# Patient Record
Sex: FEMALE | Race: WHITE | NOT HISPANIC OR LATINO | Employment: OTHER | URBAN - METROPOLITAN AREA
[De-identification: names, ages, dates, MRNs, and addresses within clinical notes are randomized per-mention and may not be internally consistent; named-entity substitution may affect disease eponyms.]

---

## 2018-02-13 ENCOUNTER — CONVERSION ENCOUNTER (OUTPATIENT)
Dept: GENERAL RADIOLOGY | Facility: HOSPITAL | Age: 83
End: 2018-02-13

## 2019-02-06 ENCOUNTER — OFFICE VISIT CONVERTED (OUTPATIENT)
Dept: NEUROLOGY | Facility: CLINIC | Age: 84
End: 2019-02-06
Attending: PSYCHIATRY & NEUROLOGY

## 2019-03-01 ENCOUNTER — HOSPITAL ENCOUNTER (OUTPATIENT)
Dept: GENERAL RADIOLOGY | Facility: HOSPITAL | Age: 84
Discharge: HOME | End: 2019-03-01
Attending: NURSE PRACTITIONER

## 2019-08-07 ENCOUNTER — OFFICE VISIT CONVERTED (OUTPATIENT)
Dept: NEUROLOGY | Facility: CLINIC | Age: 84
End: 2019-08-07
Attending: PSYCHIATRY & NEUROLOGY

## 2019-10-23 ENCOUNTER — OFFICE VISIT CONVERTED (OUTPATIENT)
Dept: FAMILY MEDICINE CLINIC | Facility: CLINIC | Age: 84
End: 2019-10-23
Attending: NURSE PRACTITIONER

## 2019-10-23 ENCOUNTER — CONVERSION ENCOUNTER (OUTPATIENT)
Dept: OTHER | Facility: HOSPITAL | Age: 84
End: 2019-10-23

## 2019-10-28 ENCOUNTER — HOSPITAL ENCOUNTER (OUTPATIENT)
Dept: GENERAL RADIOLOGY | Facility: HOSPITAL | Age: 84
Discharge: HOME | End: 2019-10-28
Attending: NURSE PRACTITIONER

## 2019-10-29 ENCOUNTER — HOSPITAL ENCOUNTER (OUTPATIENT)
Dept: OTHER | Facility: HOSPITAL | Age: 84
Discharge: HOME | End: 2019-10-29
Attending: OBSTETRICS & GYNECOLOGY

## 2019-11-01 LAB — BACTERIA UR CULT: NORMAL

## 2019-11-04 ENCOUNTER — CONVERSION ENCOUNTER (OUTPATIENT)
Dept: FAMILY MEDICINE CLINIC | Facility: CLINIC | Age: 84
End: 2019-11-04

## 2019-11-04 ENCOUNTER — OFFICE VISIT CONVERTED (OUTPATIENT)
Dept: FAMILY MEDICINE CLINIC | Facility: CLINIC | Age: 84
End: 2019-11-04
Attending: NURSE PRACTITIONER

## 2019-11-21 ENCOUNTER — HOSPITAL ENCOUNTER (OUTPATIENT)
Dept: GENERAL RADIOLOGY | Facility: HOSPITAL | Age: 84
Discharge: HOME | End: 2019-11-21
Attending: NURSE PRACTITIONER

## 2019-11-21 ENCOUNTER — OFFICE VISIT CONVERTED (OUTPATIENT)
Dept: FAMILY MEDICINE CLINIC | Facility: CLINIC | Age: 84
End: 2019-11-21
Attending: NURSE PRACTITIONER

## 2019-12-18 ENCOUNTER — OFFICE VISIT CONVERTED (OUTPATIENT)
Dept: NEUROLOGY | Facility: CLINIC | Age: 84
End: 2019-12-18
Attending: PSYCHIATRY & NEUROLOGY

## 2019-12-18 ENCOUNTER — CONVERSION ENCOUNTER (OUTPATIENT)
Dept: NEUROLOGY | Facility: CLINIC | Age: 84
End: 2019-12-18

## 2019-12-19 ENCOUNTER — OFFICE VISIT CONVERTED (OUTPATIENT)
Dept: NEUROSURGERY | Facility: CLINIC | Age: 84
End: 2019-12-19
Attending: PHYSICIAN ASSISTANT

## 2019-12-30 ENCOUNTER — HOSPITAL ENCOUNTER (OUTPATIENT)
Dept: GENERAL RADIOLOGY | Facility: HOSPITAL | Age: 84
Discharge: HOME | End: 2019-12-30
Attending: PHYSICIAN ASSISTANT

## 2020-01-06 ENCOUNTER — HOSPITAL ENCOUNTER (OUTPATIENT)
Dept: GENERAL RADIOLOGY | Facility: HOSPITAL | Age: 85
Discharge: HOME | End: 2020-01-06
Attending: NURSE PRACTITIONER

## 2020-01-07 ENCOUNTER — OFFICE VISIT CONVERTED (OUTPATIENT)
Dept: FAMILY MEDICINE CLINIC | Facility: CLINIC | Age: 85
End: 2020-01-07
Attending: NURSE PRACTITIONER

## 2020-01-08 ENCOUNTER — HOSPITAL ENCOUNTER (OUTPATIENT)
Dept: LAB | Facility: HOSPITAL | Age: 85
Discharge: HOME | End: 2020-01-08
Attending: NURSE PRACTITIONER

## 2020-01-08 LAB
25(OH)D3 SERPL-MCNC: 51 NG/ML (ref 30–100)
ALBUMIN SERPL-MCNC: 4.4 G/DL (ref 3.5–5)
ALBUMIN/GLOB SERPL: 1.3 {RATIO} (ref 1.4–2.6)
ALP SERPL-CCNC: 32 U/L (ref 43–160)
ALT SERPL-CCNC: 6 U/L (ref 10–40)
ANION GAP SERPL CALC-SCNC: 16 MMOL/L (ref 8–19)
AST SERPL-CCNC: 18 U/L (ref 15–50)
BASOPHILS # BLD AUTO: 0.07 10*3/UL (ref 0–0.2)
BASOPHILS NFR BLD AUTO: 1.2 % (ref 0–3)
BILIRUB SERPL-MCNC: 0.39 MG/DL (ref 0.2–1.3)
BUN SERPL-MCNC: 12 MG/DL (ref 5–25)
BUN/CREAT SERPL: 17 {RATIO} (ref 6–20)
CALCIUM SERPL-MCNC: 9.6 MG/DL (ref 8.7–10.4)
CHLORIDE SERPL-SCNC: 98 MMOL/L (ref 99–111)
CHOLEST SERPL-MCNC: 232 MG/DL (ref 107–200)
CHOLEST/HDLC SERPL: 4.1 {RATIO} (ref 3–6)
CONV ABS IMM GRAN: 0.01 10*3/UL (ref 0–0.2)
CONV CO2: 25 MMOL/L (ref 22–32)
CONV IMMATURE GRAN: 0.2 % (ref 0–1.8)
CONV TOTAL PROTEIN: 7.8 G/DL (ref 6.3–8.2)
CREAT UR-MCNC: 0.69 MG/DL (ref 0.5–0.9)
DEPRECATED RDW RBC AUTO: 46.2 FL (ref 36.4–46.3)
EOSINOPHIL # BLD AUTO: 0.1 10*3/UL (ref 0–0.7)
EOSINOPHIL # BLD AUTO: 1.7 % (ref 0–7)
ERYTHROCYTE [DISTWIDTH] IN BLOOD BY AUTOMATED COUNT: 13.4 % (ref 11.7–14.4)
GFR SERPLBLD BASED ON 1.73 SQ M-ARVRAT: >60 ML/MIN/{1.73_M2}
GLOBULIN UR ELPH-MCNC: 3.4 G/DL (ref 2–3.5)
GLUCOSE SERPL-MCNC: 99 MG/DL (ref 65–99)
HCT VFR BLD AUTO: 39 % (ref 37–47)
HDLC SERPL-MCNC: 56 MG/DL (ref 40–60)
HGB BLD-MCNC: 12.5 G/DL (ref 12–16)
LDLC SERPL CALC-MCNC: 148 MG/DL (ref 70–100)
LYMPHOCYTES # BLD AUTO: 2.28 10*3/UL (ref 1–5)
LYMPHOCYTES NFR BLD AUTO: 39.4 % (ref 20–45)
MCH RBC QN AUTO: 30 PG (ref 27–31)
MCHC RBC AUTO-ENTMCNC: 32.1 G/DL (ref 33–37)
MCV RBC AUTO: 93.5 FL (ref 81–99)
MONOCYTES # BLD AUTO: 0.58 10*3/UL (ref 0.2–1.2)
MONOCYTES NFR BLD AUTO: 10 % (ref 3–10)
NEUTROPHILS # BLD AUTO: 2.75 10*3/UL (ref 2–8)
NEUTROPHILS NFR BLD AUTO: 47.5 % (ref 30–85)
NRBC CBCN: 0 % (ref 0–0.7)
OSMOLALITY SERPL CALC.SUM OF ELEC: 280 MOSM/KG (ref 273–304)
PLATELET # BLD AUTO: 398 10*3/UL (ref 130–400)
PMV BLD AUTO: 9.4 FL (ref 9.4–12.3)
POTASSIUM SERPL-SCNC: 4.3 MMOL/L (ref 3.5–5.3)
RBC # BLD AUTO: 4.17 10*6/UL (ref 4.2–5.4)
SODIUM SERPL-SCNC: 135 MMOL/L (ref 135–147)
T4 FREE SERPL-MCNC: 1.5 NG/DL (ref 0.9–1.8)
TRIGL SERPL-MCNC: 140 MG/DL (ref 40–150)
TSH SERPL-ACNC: 2.14 M[IU]/L (ref 0.27–4.2)
VLDLC SERPL-MCNC: 28 MG/DL (ref 5–37)
WBC # BLD AUTO: 5.79 10*3/UL (ref 4.8–10.8)

## 2020-01-14 ENCOUNTER — OFFICE VISIT CONVERTED (OUTPATIENT)
Dept: FAMILY MEDICINE CLINIC | Facility: CLINIC | Age: 85
End: 2020-01-14
Attending: NURSE PRACTITIONER

## 2020-01-23 ENCOUNTER — OFFICE VISIT CONVERTED (OUTPATIENT)
Dept: NEUROSURGERY | Facility: CLINIC | Age: 85
End: 2020-01-23
Attending: NEUROLOGICAL SURGERY

## 2020-02-25 ENCOUNTER — OFFICE VISIT CONVERTED (OUTPATIENT)
Dept: FAMILY MEDICINE CLINIC | Facility: CLINIC | Age: 85
End: 2020-02-25
Attending: NURSE PRACTITIONER

## 2020-02-25 ENCOUNTER — HOSPITAL ENCOUNTER (OUTPATIENT)
Dept: GENERAL RADIOLOGY | Facility: HOSPITAL | Age: 85
Discharge: HOME | End: 2020-02-25
Attending: NURSE PRACTITIONER

## 2020-04-23 ENCOUNTER — TELEPHONE CONVERTED (OUTPATIENT)
Dept: FAMILY MEDICINE CLINIC | Facility: CLINIC | Age: 85
End: 2020-04-23
Attending: NURSE PRACTITIONER

## 2020-06-18 ENCOUNTER — CONVERSION ENCOUNTER (OUTPATIENT)
Dept: NEUROLOGY | Facility: CLINIC | Age: 85
End: 2020-06-18

## 2020-06-18 ENCOUNTER — OFFICE VISIT CONVERTED (OUTPATIENT)
Dept: NEUROLOGY | Facility: CLINIC | Age: 85
End: 2020-06-18
Attending: PSYCHIATRY & NEUROLOGY

## 2020-07-02 ENCOUNTER — HOSPITAL ENCOUNTER (OUTPATIENT)
Dept: GENERAL RADIOLOGY | Facility: HOSPITAL | Age: 85
Discharge: HOME | End: 2020-07-02
Attending: NURSE PRACTITIONER

## 2020-07-08 ENCOUNTER — HOSPITAL ENCOUNTER (OUTPATIENT)
Dept: LAB | Facility: HOSPITAL | Age: 85
Discharge: HOME | End: 2020-07-08
Attending: NURSE PRACTITIONER

## 2020-07-08 LAB
CHOLEST SERPL-MCNC: 235 MG/DL (ref 107–200)
CHOLEST/HDLC SERPL: 3.4 {RATIO} (ref 3–6)
HDLC SERPL-MCNC: 69 MG/DL (ref 40–60)
LDLC SERPL CALC-MCNC: 148 MG/DL (ref 70–100)
TRIGL SERPL-MCNC: 88 MG/DL (ref 40–150)
VLDLC SERPL-MCNC: 18 MG/DL (ref 5–37)

## 2020-07-14 ENCOUNTER — HOSPITAL ENCOUNTER (OUTPATIENT)
Dept: URGENT CARE | Facility: CLINIC | Age: 85
Discharge: HOME | End: 2020-07-14
Attending: INTERNAL MEDICINE

## 2020-07-17 LAB — SARS-COV-2 RNA SPEC QL NAA+PROBE: NOT DETECTED

## 2020-07-23 ENCOUNTER — HOSPITAL ENCOUNTER (OUTPATIENT)
Dept: LAB | Facility: HOSPITAL | Age: 85
Discharge: HOME | End: 2020-07-23
Attending: INTERNAL MEDICINE

## 2020-07-23 LAB
ALBUMIN SERPL-MCNC: 4.5 G/DL (ref 3.5–5)
ALBUMIN/GLOB SERPL: 1.4 {RATIO} (ref 1.4–2.6)
ALP SERPL-CCNC: 29 U/L (ref 43–160)
ALT SERPL-CCNC: <5 U/L (ref 10–40)
ANION GAP SERPL CALC-SCNC: 19 MMOL/L (ref 8–19)
AST SERPL-CCNC: 20 U/L (ref 15–50)
BILIRUB SERPL-MCNC: 0.35 MG/DL (ref 0.2–1.3)
BUN SERPL-MCNC: 14 MG/DL (ref 5–25)
BUN/CREAT SERPL: 19 {RATIO} (ref 6–20)
CALCIUM SERPL-MCNC: 9.7 MG/DL (ref 8.7–10.4)
CHLORIDE SERPL-SCNC: 100 MMOL/L (ref 99–111)
CHOLEST SERPL-MCNC: 226 MG/DL (ref 107–200)
CHOLEST/HDLC SERPL: 3.4 {RATIO} (ref 3–6)
CONV CO2: 26 MMOL/L (ref 22–32)
CONV TOTAL PROTEIN: 7.8 G/DL (ref 6.3–8.2)
CREAT UR-MCNC: 0.75 MG/DL (ref 0.5–0.9)
GFR SERPLBLD BASED ON 1.73 SQ M-ARVRAT: >60 ML/MIN/{1.73_M2}
GLOBULIN UR ELPH-MCNC: 3.3 G/DL (ref 2–3.5)
GLUCOSE SERPL-MCNC: 103 MG/DL (ref 65–99)
HDLC SERPL-MCNC: 67 MG/DL (ref 40–60)
LDLC SERPL CALC-MCNC: 138 MG/DL (ref 70–100)
OSMOLALITY SERPL CALC.SUM OF ELEC: 291 MOSM/KG (ref 273–304)
POTASSIUM SERPL-SCNC: 4.5 MMOL/L (ref 3.5–5.3)
SODIUM SERPL-SCNC: 140 MMOL/L (ref 135–147)
T4 FREE SERPL-MCNC: 1.5 NG/DL (ref 0.9–1.8)
TRIGL SERPL-MCNC: 103 MG/DL (ref 40–150)
TSH SERPL-ACNC: 2.16 M[IU]/L (ref 0.27–4.2)
VLDLC SERPL-MCNC: 21 MG/DL (ref 5–37)

## 2020-09-08 ENCOUNTER — TELEMEDICINE CONVERTED (OUTPATIENT)
Dept: FAMILY MEDICINE CLINIC | Facility: CLINIC | Age: 85
End: 2020-09-08
Attending: NURSE PRACTITIONER

## 2020-09-08 ENCOUNTER — HOSPITAL ENCOUNTER (OUTPATIENT)
Dept: GENERAL RADIOLOGY | Facility: HOSPITAL | Age: 85
Discharge: HOME | End: 2020-09-08
Attending: NURSE PRACTITIONER

## 2020-10-26 ENCOUNTER — OFFICE VISIT CONVERTED (OUTPATIENT)
Dept: FAMILY MEDICINE CLINIC | Facility: CLINIC | Age: 85
End: 2020-10-26
Attending: NURSE PRACTITIONER

## 2020-10-30 ENCOUNTER — HOSPITAL ENCOUNTER (OUTPATIENT)
Dept: LAB | Facility: HOSPITAL | Age: 85
Discharge: HOME | End: 2020-10-30
Attending: NURSE PRACTITIONER

## 2020-10-30 LAB
25(OH)D3 SERPL-MCNC: 53.6 NG/ML (ref 30–100)
ALBUMIN SERPL-MCNC: 4.3 G/DL (ref 3.5–5)
ALBUMIN/GLOB SERPL: 1.4 {RATIO} (ref 1.4–2.6)
ALP SERPL-CCNC: 33 U/L (ref 43–160)
ALT SERPL-CCNC: 14 U/L (ref 10–40)
ANION GAP SERPL CALC-SCNC: 16 MMOL/L (ref 8–19)
AST SERPL-CCNC: 19 U/L (ref 15–50)
BASOPHILS # BLD AUTO: 0.06 10*3/UL (ref 0–0.2)
BASOPHILS NFR BLD AUTO: 0.9 % (ref 0–3)
BILIRUB SERPL-MCNC: 0.3 MG/DL (ref 0.2–1.3)
BUN SERPL-MCNC: 17 MG/DL (ref 5–25)
BUN/CREAT SERPL: 23 {RATIO} (ref 6–20)
CALCIUM SERPL-MCNC: 9.2 MG/DL (ref 8.7–10.4)
CHLORIDE SERPL-SCNC: 102 MMOL/L (ref 99–111)
CHOLEST SERPL-MCNC: 228 MG/DL (ref 107–200)
CHOLEST/HDLC SERPL: 3.5 {RATIO} (ref 3–6)
CONV ABS IMM GRAN: 0.02 10*3/UL (ref 0–0.2)
CONV CO2: 25 MMOL/L (ref 22–32)
CONV IMMATURE GRAN: 0.3 % (ref 0–1.8)
CONV TOTAL PROTEIN: 7.4 G/DL (ref 6.3–8.2)
CREAT UR-MCNC: 0.74 MG/DL (ref 0.5–0.9)
DEPRECATED RDW RBC AUTO: 44.1 FL (ref 36.4–46.3)
EOSINOPHIL # BLD AUTO: 0.13 10*3/UL (ref 0–0.7)
EOSINOPHIL # BLD AUTO: 2 % (ref 0–7)
ERYTHROCYTE [DISTWIDTH] IN BLOOD BY AUTOMATED COUNT: 13.1 % (ref 11.7–14.4)
GFR SERPLBLD BASED ON 1.73 SQ M-ARVRAT: >60 ML/MIN/{1.73_M2}
GLOBULIN UR ELPH-MCNC: 3.1 G/DL (ref 2–3.5)
GLUCOSE SERPL-MCNC: 94 MG/DL (ref 65–99)
HCT VFR BLD AUTO: 37.7 % (ref 37–47)
HDLC SERPL-MCNC: 65 MG/DL (ref 40–60)
HGB BLD-MCNC: 12 G/DL (ref 12–16)
LDLC SERPL CALC-MCNC: 138 MG/DL (ref 70–100)
LYMPHOCYTES # BLD AUTO: 2.52 10*3/UL (ref 1–5)
LYMPHOCYTES NFR BLD AUTO: 38.3 % (ref 20–45)
MAGNESIUM SERPL-MCNC: 2.08 MG/DL (ref 1.6–2.3)
MCH RBC QN AUTO: 29.2 PG (ref 27–31)
MCHC RBC AUTO-ENTMCNC: 31.8 G/DL (ref 33–37)
MCV RBC AUTO: 91.7 FL (ref 81–99)
MONOCYTES # BLD AUTO: 0.58 10*3/UL (ref 0.2–1.2)
MONOCYTES NFR BLD AUTO: 8.8 % (ref 3–10)
NEUTROPHILS # BLD AUTO: 3.27 10*3/UL (ref 2–8)
NEUTROPHILS NFR BLD AUTO: 49.7 % (ref 30–85)
NRBC CBCN: 0 % (ref 0–0.7)
OSMOLALITY SERPL CALC.SUM OF ELEC: 289 MOSM/KG (ref 273–304)
PLATELET # BLD AUTO: 351 10*3/UL (ref 130–400)
PMV BLD AUTO: 10.1 FL (ref 9.4–12.3)
POTASSIUM SERPL-SCNC: 4.3 MMOL/L (ref 3.5–5.3)
RBC # BLD AUTO: 4.11 10*6/UL (ref 4.2–5.4)
SODIUM SERPL-SCNC: 139 MMOL/L (ref 135–147)
T4 FREE SERPL-MCNC: 1.4 NG/DL (ref 0.9–1.8)
TRIGL SERPL-MCNC: 123 MG/DL (ref 40–150)
TSH SERPL-ACNC: 2.77 M[IU]/L (ref 0.27–4.2)
VLDLC SERPL-MCNC: 25 MG/DL (ref 5–37)
WBC # BLD AUTO: 6.58 10*3/UL (ref 4.8–10.8)

## 2020-11-03 ENCOUNTER — HOSPITAL ENCOUNTER (OUTPATIENT)
Dept: GENERAL RADIOLOGY | Facility: HOSPITAL | Age: 85
Discharge: HOME | End: 2020-11-03
Attending: NURSE PRACTITIONER

## 2020-11-23 ENCOUNTER — OFFICE VISIT CONVERTED (OUTPATIENT)
Dept: PODIATRY | Facility: CLINIC | Age: 85
End: 2020-11-23
Attending: PODIATRIST

## 2020-11-24 ENCOUNTER — OFFICE VISIT CONVERTED (OUTPATIENT)
Dept: CARDIOLOGY | Facility: CLINIC | Age: 85
End: 2020-11-24
Attending: INTERNAL MEDICINE

## 2020-11-24 ENCOUNTER — TELEMEDICINE CONVERTED (OUTPATIENT)
Dept: FAMILY MEDICINE CLINIC | Facility: CLINIC | Age: 85
End: 2020-11-24
Attending: NURSE PRACTITIONER

## 2020-11-25 ENCOUNTER — HOSPITAL ENCOUNTER (OUTPATIENT)
Dept: LAB | Facility: HOSPITAL | Age: 85
Discharge: HOME | End: 2020-11-25
Attending: NURSE PRACTITIONER

## 2020-11-25 LAB
APPEARANCE UR: ABNORMAL
BILIRUB UR QL: NEGATIVE
COLOR UR: YELLOW
CONV BACTERIA: NEGATIVE
CONV COLLECTION SOURCE (UA): ABNORMAL
CONV UROBILINOGEN IN URINE BY AUTOMATED TEST STRIP: 0.2 {EHRLICHU}/DL (ref 0.1–1)
GLUCOSE UR QL: NEGATIVE MG/DL
HGB UR QL STRIP: NEGATIVE
KETONES UR QL STRIP: NEGATIVE MG/DL
LEUKOCYTE ESTERASE UR QL STRIP: ABNORMAL
NITRITE UR QL STRIP: NEGATIVE
PH UR STRIP.AUTO: 6.5 [PH] (ref 5–8)
PROT UR QL: NEGATIVE MG/DL
RBC #/AREA URNS HPF: ABNORMAL /[HPF]
SP GR UR: 1.01 (ref 1–1.03)
WBC #/AREA URNS HPF: ABNORMAL /[HPF]

## 2020-11-27 LAB — BACTERIA UR CULT: NORMAL

## 2020-12-14 ENCOUNTER — OFFICE VISIT CONVERTED (OUTPATIENT)
Dept: OTOLARYNGOLOGY | Facility: CLINIC | Age: 85
End: 2020-12-14
Attending: NURSE PRACTITIONER

## 2021-01-14 ENCOUNTER — HOSPITAL ENCOUNTER (OUTPATIENT)
Dept: GENERAL RADIOLOGY | Facility: HOSPITAL | Age: 86
Discharge: HOME | End: 2021-01-14
Attending: NURSE PRACTITIONER

## 2021-02-22 ENCOUNTER — HOSPITAL ENCOUNTER (OUTPATIENT)
Dept: LAB | Facility: HOSPITAL | Age: 86
Discharge: HOME | End: 2021-02-22
Attending: INTERNAL MEDICINE

## 2021-02-22 LAB
ALBUMIN SERPL-MCNC: 4.3 G/DL (ref 3.5–5)
ALBUMIN/GLOB SERPL: 1.4 {RATIO} (ref 1.4–2.6)
ALP SERPL-CCNC: 27 U/L (ref 43–160)
ALT SERPL-CCNC: 14 U/L (ref 10–40)
ANION GAP SERPL CALC-SCNC: 14 MMOL/L (ref 8–19)
AST SERPL-CCNC: 18 U/L (ref 15–50)
BILIRUB SERPL-MCNC: 0.25 MG/DL (ref 0.2–1.3)
BUN SERPL-MCNC: 16 MG/DL (ref 5–25)
BUN/CREAT SERPL: 22 {RATIO} (ref 6–20)
CALCIUM SERPL-MCNC: 9.3 MG/DL (ref 8.7–10.4)
CHLORIDE SERPL-SCNC: 102 MMOL/L (ref 99–111)
CHOLEST SERPL-MCNC: 175 MG/DL (ref 107–200)
CHOLEST/HDLC SERPL: 2.7 {RATIO} (ref 3–6)
CONV CO2: 27 MMOL/L (ref 22–32)
CONV TOTAL PROTEIN: 7.3 G/DL (ref 6.3–8.2)
CREAT UR-MCNC: 0.73 MG/DL (ref 0.5–0.9)
GFR SERPLBLD BASED ON 1.73 SQ M-ARVRAT: >60 ML/MIN/{1.73_M2}
GLOBULIN UR ELPH-MCNC: 3 G/DL (ref 2–3.5)
GLUCOSE SERPL-MCNC: 97 MG/DL (ref 65–99)
HDLC SERPL-MCNC: 65 MG/DL (ref 40–60)
LDLC SERPL CALC-MCNC: 88 MG/DL (ref 70–100)
OSMOLALITY SERPL CALC.SUM OF ELEC: 287 MOSM/KG (ref 273–304)
POTASSIUM SERPL-SCNC: 4.7 MMOL/L (ref 3.5–5.3)
SODIUM SERPL-SCNC: 138 MMOL/L (ref 135–147)
TRIGL SERPL-MCNC: 109 MG/DL (ref 40–150)
VLDLC SERPL-MCNC: 22 MG/DL (ref 5–37)

## 2021-03-03 ENCOUNTER — CONVERSION ENCOUNTER (OUTPATIENT)
Dept: ORTHOPEDIC SURGERY | Facility: CLINIC | Age: 86
End: 2021-03-03

## 2021-03-03 ENCOUNTER — OFFICE VISIT CONVERTED (OUTPATIENT)
Dept: ORTHOPEDIC SURGERY | Facility: CLINIC | Age: 86
End: 2021-03-03
Attending: ORTHOPAEDIC SURGERY

## 2021-03-16 ENCOUNTER — HOSPITAL ENCOUNTER (OUTPATIENT)
Dept: VACCINE CLINIC | Facility: HOSPITAL | Age: 86
Discharge: HOME | End: 2021-03-16
Attending: INTERNAL MEDICINE

## 2021-03-18 ENCOUNTER — PROCEDURE VISIT CONVERTED (OUTPATIENT)
Dept: PODIATRY | Facility: CLINIC | Age: 86
End: 2021-03-18
Attending: PODIATRIST

## 2021-03-18 ENCOUNTER — CONVERSION ENCOUNTER (OUTPATIENT)
Dept: PODIATRY | Facility: CLINIC | Age: 86
End: 2021-03-18

## 2021-04-06 ENCOUNTER — HOSPITAL ENCOUNTER (OUTPATIENT)
Dept: VACCINE CLINIC | Facility: HOSPITAL | Age: 86
Discharge: HOME | End: 2021-04-06
Attending: INTERNAL MEDICINE

## 2021-04-12 ENCOUNTER — CONVERSION ENCOUNTER (OUTPATIENT)
Dept: FAMILY MEDICINE CLINIC | Facility: CLINIC | Age: 86
End: 2021-04-12

## 2021-04-12 ENCOUNTER — OFFICE VISIT CONVERTED (OUTPATIENT)
Dept: FAMILY MEDICINE CLINIC | Facility: CLINIC | Age: 86
End: 2021-04-12
Attending: NURSE PRACTITIONER

## 2021-04-12 LAB
BILIRUB UR QL STRIP: NEGATIVE
COLOR UR: NORMAL
CONV CLARITY OF URINE: CLEAR
CONV PROTEIN IN URINE BY AUTOMATED TEST STRIP: NEGATIVE
CONV UROBILINOGEN IN URINE BY AUTOMATED TEST STRIP: NORMAL
GLUCOSE UR QL: NEGATIVE
HGB UR QL STRIP: NORMAL
KETONES UR QL STRIP: NEGATIVE
LEUKOCYTE ESTERASE UR QL STRIP: NORMAL
NITRITE UR QL STRIP: NEGATIVE
PH UR STRIP.AUTO: 7 [PH]
SP GR UR: 1.01

## 2021-04-13 ENCOUNTER — HOSPITAL ENCOUNTER (OUTPATIENT)
Dept: FAMILY MEDICINE CLINIC | Facility: CLINIC | Age: 86
Discharge: HOME | End: 2021-04-13
Attending: NURSE PRACTITIONER

## 2021-04-15 LAB
AMPICILLIN SUSC ISLT: >=32
AMPICILLIN+SULBAC SUSC ISLT: 8
BACTERIA UR CULT: ABNORMAL
CEFAZOLIN SUSC ISLT: >=64
CEFEPIME SUSC ISLT: 8
CEFTAZIDIME SUSC ISLT: 16
CEFTRIAXONE SUSC ISLT: >=64
CIPROFLOXACIN SUSC ISLT: <=0.25
ERTAPENEM SUSC ISLT: <=0.12
GENTAMICIN SUSC ISLT: <=1
LEVOFLOXACIN SUSC ISLT: <=0.12
NITROFURANTOIN SUSC ISLT: <=16
PIP+TAZO SUSC ISLT: <=4
TMP SMX SUSC ISLT: <=20
TOBRAMYCIN SUSC ISLT: <=1

## 2021-04-29 ENCOUNTER — HOSPITAL ENCOUNTER (OUTPATIENT)
Dept: FAMILY MEDICINE CLINIC | Facility: CLINIC | Age: 86
Discharge: HOME | End: 2021-04-29
Attending: NURSE PRACTITIONER

## 2021-04-29 ENCOUNTER — OFFICE VISIT CONVERTED (OUTPATIENT)
Dept: FAMILY MEDICINE CLINIC | Facility: CLINIC | Age: 86
End: 2021-04-29
Attending: NURSE PRACTITIONER

## 2021-04-29 LAB
BILIRUB UR QL STRIP: NEGATIVE
COLOR UR: NORMAL
CONV CLARITY OF URINE: CLEAR
CONV PROTEIN IN URINE BY AUTOMATED TEST STRIP: NEGATIVE
CONV UROBILINOGEN IN URINE BY AUTOMATED TEST STRIP: NORMAL
GLUCOSE UR QL: NEGATIVE
HGB UR QL STRIP: NEGATIVE
KETONES UR QL STRIP: NEGATIVE
LEUKOCYTE ESTERASE UR QL STRIP: NORMAL
NITRITE UR QL STRIP: NEGATIVE
PH UR STRIP.AUTO: 7 [PH]
SP GR UR: 1.01

## 2021-05-01 LAB — BACTERIA UR CULT: NORMAL

## 2021-05-10 NOTE — H&P
History and Physical      Patient Name: Geovanna Whitten   Patient ID: [id number]   Sex: Female   YOB: 1935    Primary Care Provider: Rivka MALHOTRA   Referring Provider: Rivka MALHOTRA    Visit Date: November 24, 2020    Provider: Laith Boyer MD   Location: [facility]   Location Address: [address] [place] [address], [address]   [place]  [id number]   Location Phone: [phone]          Chief Complaint  · She was referred here to establish care due to hypertension and hyperlipidemia       History Of Present Illness  Consult requested by: Rivka MALHOTRA   Geovanna Whitten is a pleasant, 85 year old, /White female with a history of hypertension, hyperlipidemia, non-obstructive carotid stenosis and moderate mitral regurgitation, who was referred here to establish care by her PCP, Rivka Blair. She previously saw Dr. Lacy many years ago and has most recently been following with Dr. Rasmusesn for her cardiac care. She states she is feeling well and remains at her normal physical-activity baseline. She does not report any episodes of chest pain/pressure, any palpitations, orthopnea, PND, peripheral edema, lightheadedness or syncope. She does have mild chronic exertional dyspnea but states this has been stable for several years. She is tolerating all her home medications well but states her blood pressure readings continue to be elevated. Her blood pressure was 140/48 in the office today. Her most recent LDL was elevated at 138 on lab studies checked last month. She has a history of intolerance to numerous statins (including Atorvastatin, Lovastatin and Simvastatin) due to severe myalgias and a tremor, which she is convinced was caused by previous statin use. Ms. Whitten states she is unwilling to consider any further statins at this time. Her most recent echocardiogram obtained in Dr. Rasmussen's office in July 2020 showed normal left ventricular systolic function with an  estimated ejection fraction of 60% and sclerotic mitral and aortic valves with moderate mitral regurgitation and moderate aortic regurgitation. She was also noted to have a mildly elevated estimated pulmonary arterial pressure on that study. We are still waiting to receive a copy of her most recent EKG she had at Dr. Rasmussen's office several months ago.   PAST MEDICAL HISTORY: Hypertension; hyperlipidemia; moderate mitral regurgitation; moderate aortic regurgitation; mild pulmonary hypertension; osteoarthritis; glaucoma; essential tremor; hypothyroidism. PAST SURGICAL HISTORY: Hysterectomy; colonoscopy; bilateral cataract excision.   PSYCHOSOCIAL HISTORY: She does not report any history of tobacco use, alcohol abuse or illicit drug use. She is  and lives alone. She states she walks one to two blocks per day and engages in yoga several times per week.   FAMILY HISTORY: Positive for diabetes mellitus in her mother and hypertension in multiple family members; also positive for coronary artery disease in her father.   CURRENT MEDICATIONS: include Levothyroxine 75 mcg daily; Lisinopril 10 mg daily; Amlodipine 5 mg daily; carbidopa/levodopa 25/100 mg b.i.d.; Pramipexole 0.5 mg b.i.d.; Latanoprost; Vitamin D3; multivitamin; Tylenol Arthritis; Citalopram 10 mg p.r.n. The dosage and frequency of the medications were reviewed with the patient.   ALLERGIES: Sulfa, iodine.       Review of Systems  · Constitutional  o Denies  o : fatigue, good general health lately, recent weight changes   · Eyes  o Admits  o : blurred vision  o Denies  o : double vision  · HENT  o Admits  o : hearing loss or ringing, chronic sinus problem  o Denies  o : swollen glands in neck  · Cardiovascular  o Admits  o : swelling (feet, ankles, hands)  o Denies  o : chest pain, palpitations (fast, fluttering, or skipping beats), shortness of breath while walking or lying flat  · Respiratory  o Admits  o : chronic or frequent cough  o Denies  o :  "asthma or wheezing, COPD  · Gastrointestinal  o Denies  o : ulcers, nausea or vomiting  · Neurologic  o Denies  o : lightheaded or dizzy, stroke, headaches  · Musculoskeletal  o Admits  o : joint pain, back pain  · Endocrine  o Admits  o : thyroid disease, heat or cold intolerance, excessive thirst or urination  o Denies  o : diabetes  · Heme-Lymph  o Admits  o : bleeding or bruising tendency  o Denies  o : anemia      Vitals  Date Time BP Position Site L\R Cuff Size HR RR TEMP (F) WT  HT  BMI kg/m2 BSA m2 O2 Sat FR L/min FiO2 HC       11/24/2020 11:35 /48 Sitting    70 - R   141lbs 0oz 5'  4\" 24.2 1.7             Physical Examination  · Constitutional  o Appearance  o : Awake, alert, in no acute distress.  · Head and Face  o HEENT  o : No pallor, anicteric. Eyes normal. Moist mucous membranes.  · Neck  o Inspection/Palpation  o : Supple.   o Jugular Veins  o : No JVD. No carotid bruits. No masses.  · Respiratory  o Auscultation of Lungs  o : Clear to auscultation bilaterally. No wheezing, rales or rhonchi. No tachycardia. Normal effort with no increased work of breathing. No dullness to percussion.  · Cardiovascular  o Heart  o : Regular rate and rhythm. Normal S1 and S2. There is a 2/6 systolic murmur at the apex with radiation to the axilla as well as a systolic and mild diastolic murmur at the right upper sternal border. No friction rub. No S3 or S4 gallop.  · Gastrointestinal  o Abdominal Examination  o : Soft, non-tender, non-distended. Normal bowel sounds in all quadrants. No masses. No hepatosplenomegaly.   · Lymphatic  o Axilla  o : No cervical or axilla lymphadenopathy.  · Skin and Subcutaneous Tissue  o General Inspection  o : Warm and dry. No rashes or lesions. Normal skin turgor. Normal capillary refill.   · Neurologic  o Cranial Nerves  o : Normal speech. Cranial nerves 2-12 intact. No focal motor deficits.   · Extremities  o Extremities  o : No cyanosis, clubbing or edema. 2+ radial pulses " bilaterally.     Labs from October 30, 2020 - CBC:  White blood cells 6.58, hemoglobin 12.0, hematocrit 37.7, platelets 351.  Chemistry:  Sodium 139, potassium 4.3, chloride 102, bicarb 25, BUN 17, creatinine 0.74, glucose 94.  Liver function tests were within normal limits.  Fasting lipid profile:  , , HDL 65, .           Assessment     1.  Essential hypertension, suboptimally controlled on current medical therapy.  2.  Hyperlipidemia with recent LDL level of 138.  3.  Moderate mitral regurgitation and moderate aortic regurgitation with sclerotic valves per recent        echocardiogram.  4.  History of non-obstructive mild carotid stenosis.       Plan     Given her known peripheral arterial disease, her LDL goal is less than 70.  She refuses to consider statin medications as she has been intolerant of at least 3-4 statins in the past.  Will start Ezetimibe 10 mg daily and recheck a fasting lipid profile in 4 to 6 weeks.  If her LDL remains above goal, will likely need to start PCSK9 inhibitor therapy.  I recommended she increase her Lisinopril dosing schedule to 10 mg twice daily for her suboptimally controlled hypertension.  She will also continue her current dose of Amlodipine.  I recommended she start aspirin 81 mg daily for primary prevention of myocardial infarction and stroke, and she is agreeable to do so.  She is essentially asymptomatic at this point, and I do not believe she requires further cardiovascular testing at this time.  I will plan to see her back in the office in 6 months for reassessment.    Laith Boyer MD  BP/dmd           This note was transcribed by Daphne Sanches.  dmd/BP  The above service was transcribed by Daphne Sanches, and I attest to the accuracy of the note.  BP               Electronically Signed by: Daphne Sanches-, -Author on December 1, 2020 10:32:36 AM  Electronically Co-signed by: Laith Boyer MD -Reviewer on December 7, 2020 09:56:07 AM

## 2021-05-10 NOTE — H&P
History and Physical      Patient Name: Geovanna Whitten   Patient ID: [id number]   Sex: Female   YOB: 1935    Primary Care Provider: Rivka MALHOTRA   Referring Provider: Rivka MALHOTRA    Visit Date: March 3, 2021    Provider: Erlin Montemayor MD   Location: [facility]   Location Address: [address] [place] [address]  [id number]   Location Phone: [phone]          Chief Complaint  · Left Hip Pain      History Of Present Illness  Geovanna Whitten is a 85 year old /White female who presents today to [facility].      Patient presents today for an evaluation of left hip pain. She states she has cellulitis and bursitis. She states pain on the lateral aspect of her hip that radiates down her leg. She states pain has increased to the point she has trouble walking. She states her lateral hip is tender to touch. She denies any trauma or injury to her left hip.       Past Medical History  Acquired spondylolisthesis , lumbar; Arthritis; Bladder problem; Bone Density Screening; Corns and callus; Degeneration of lumbar intervertebral disc; Gastroesophageal Reflux Disorder; Gout; Hearing decreased; High cholesterol; High cholesterol; Hypertension, Benign Essential; Ingrowing toenail; Leg pain; Leg swelling; Lumbago/low back pain; Migraines; Muscle cramps; Numbness in feet; Pain, Arm; Pain, Joint; Parkinson's Disease; Radiculopathy, lumbosacral; Scoliosis; Screening mammogram, encounter for; Thyroid disease; Vertigo         Past Surgical History  Bladder Surg.; EYE SURGERY; Glaucoma surgery; Hysterectomy         Medication List  amlodipine 5 mg oral tablet; aspirin 81 mg oral tablet,delayed release (DR/EC); Calcium 600 600 mg calcium (1,500 mg) oral tablet; carbidopa-levodopa  mg oral tablet; citalopram 20 mg oral tablet; iron 325 mg (65 mg iron) oral tablet; latanoprost 0.005 % ophthalmic (eye) drops; levothyroxine 75 mcg oral tablet; lisinopril 10 mg oral tablet;  "pramipexole 0.5 mg oral tablet; Pravachol 20 mg oral tablet; pravastatin 20 mg oral tablet; Ultram 50 mg oral tablet; Vitamin D3 2,000 unit oral capsule; Women's Daily Multivitamin oral         Allergy List  iodine; SULFA (SULFONAMIDES)       Allergies Reconciled  Family Medical History  Family history of Arthritis; Family history of heart disease; Family history of diabetes mellitus         Reproductive History   5 Para 5 0 0 0       Social History  Advance Care Plan/Surrogate Decision Maker scanned into EMR; Alcohol (Never); lives alone; No known infection risk; Tobacco (Never);          Immunizations  Name Date Admin   Influenza 10/01/2020   Influenza 10/01/2019         Review of Systems  · Constitutional  o Denies  o : fever, chills, weight loss  · Cardiovascular  o Denies  o : chest pain, shortness of breath  · Gastrointestinal  o Denies  o : liver disease, heartburn, nausea, blood in stools  · Genitourinary  o Denies  o : painful urination, blood in urine  · Integument  o Denies  o : rash, itching  · Neurologic  o Denies  o : headache, weakness, loss of consciousness  · Musculoskeletal  o Denies  o : painful, swollen joints  · Psychiatric  o Denies  o : drug/alcohol addiction, anxiety, depression      Vitals  Date Time BP Position Site L\R Cuff Size HR RR TEMP (F) WT  HT  BMI kg/m2 BSA m2 O2 Sat FR L/min FiO2        2021 08:00 AM      78 - R   146lbs 16oz 5'  3\" 26.04 1.72 98 %            Physical Examination  · Constitutional  o Appearance  o : well developed, well-nourished, no obvious deformities present  · Head and Face  o Head  o :   § Inspection  § : normocephalic  o Face  o :   § Inspection  § : no facial lesions  · Eyes  o Conjunctivae  o : conjunctivae normal  o Sclerae  o : sclerae white  · Ears, Nose, Mouth and Throat  o Ears  o :   § External Ears  § : appearance within normal limits  § Hearing  § : intact  o Nose  o :   § External Nose  § : appearance " normal  · Neck  o Inspection/Palpation  o : normal appearance  o Range of Motion  o : full range of motion  · Respiratory  o Respiratory Effort  o : breathing unlabored  o Inspection of Chest  o : normal appearance  o Auscultation of Lungs  o : no audible wheezing or rales  · Cardiovascular  o Heart  o : regular rate  · Gastrointestinal  o Abdominal Examination  o : soft and non-tender  · Skin and Subcutaneous Tissue  o General Inspection  o : intact, no rashes  · Psychiatric  o General  o : Alert and oriented x3  o Judgement and Insight  o : judgment and insight intact  o Mood and Affect  o : mood normal, affect appropriate  · Left Hip  o Inspection  o : Sensation grossly intact. Neurovascular intact. Skin intact. Pulses are pleasant. Full weight bearing. Good tone of hip flexors, hip extensors, hip adductor, hip abductors. Tender greater trochanter. No swelling, skin discoloration or atrophy. Antalgic gait. Fluid retention bilaterally. Full leg raise.   · Injection Note/Aspiration Note  o Site  o : left hip   o Procedure  o : Procedure: After educating the patient, patient gave consent for procedure. After using Chloraprep, the joint space was injected. The patient tolerated the procedure well.   o Medication  o : 80 mg of DepoMedrol with 9cc of 1% Lidocaine  · In Office Procedures  o View  o : AP/LATERAL  o Study  o : X-rays ordered, taken in the office, and reviewed today.  o Xray  o : No significant degenerative changes.           Assessment  · Trochanteric bursitis of left hip     726.5/M70.62  · Left Pain: Hip     719.45/M25.559      Plan  · Orders  o Depo-Medrol injection 80mg () - 719.45/M25.559 - 03/03/2021   Lot [id number] Exp 01 2022 [employer] Administered by CAROL ANN Montemayor MD  o Hip Intra-articular Injection without US Guidance [facility] ([id number]) - 719.45/M25.559 - 03/03/2021   Lot [id number] Exp 03 01 2022 [employer] Administered by CAROL ANN Montemayor MD  · Medications  o Medications have been Reconciled  o Transition of  Care or Provider Policy  · Instructions  o Dr. Montemayor saw and examined the patient and agrees with plan.   o X-rays reviewed by Dr. Montemayor.  o Reviewed the patient's Past Medical, Social, and Family history as well as the ROS at today's visit, no changes.  o Call or return if worsening symptoms.  o Exercise handout given.  o Follow Up PRN.  o The above service was scribed by Marietta Guerra on my behalf and I attest to the accuracy of the note. jamia  o Discussed diagnosis and treatment options with the patient. Patient opted for a left hip injection and tolerated it well. Patient given a prescription for physical therapy for trochanteric bursitis. Patient given at home exercises for trochanteric bursitis.            Electronically Signed by: Marietta Guerra-, Other -Author on March 4, 2021 03:11:02 PM  Electronically Co-signed by: Erlin Montemayor MD -Reviewer on March 7, 2021 09:44:22 AM

## 2021-05-10 NOTE — H&P
"   History and Physical      Patient Name: Geovanna Whitten   Patient ID: [id number]   Sex: Female   YOB: 1935    Primary Care Provider: Rivka MALHOTRA   Referring Provider: Rivka MALHOTRA    Visit Date: December 14, 2020    Provider: ROSCOE Bhatti   Location: [facility]   Location Address: [address] [place] [address], [address] [place] [address]  [id number]   Location Phone: [phone]          Chief Complaint     1.  Hearing loss    2.  Tinnitus    3.  Cerumen impactions       History Of Present Illness  Geovanna Whitten is a 85 year old /White female who presents to the office today as a consult from Rivka MALHOTRA.      She presents the clinic today for evaluation of her ears and hearing.  She reports that over the past few months she has noticed a change in her hearing.  She states that she is having trouble with hearing her TV and has to turn it up much louder than she fell just a few months ago.  She feels like her ears are \"stopped up\".  She reports occasional left-sided otalgia that is short in duration.  She denies any history of recurrent otitis media infections or otorrhea.  She has never had any ear surgeries.  She does state that she has very narrow ear canals.  She reports a longstanding history with bilateral ear ringing.  She denies any significant noise exposure history.  She also states that over the past 4 months she has felt unsteady on her feet.  She denies any vertigo symptoms.  She reports that she has had issues with her blood pressure fluctuating from high to low.  She has been using a walker at times at home.  She denies any falls.       Past Medical History  Acquired spondylolisthesis , lumbar; Arthritis; Bladder problem; Bone Density Screening; Corns and callus; Degeneration of lumbar intervertebral disc; Gastroesophageal Reflux Disorder; Gout; Hearing decreased; High cholesterol; High cholesterol; Hypertension, Benign Essential; Ingrowing " toenail; Leg pain; Leg swelling; Lumbago/low back pain; Migraines; Muscle cramps; Numbness in feet; Pain, Arm; Pain, Joint; Parkinson's Disease; Radiculopathy, lumbosacral; Scoliosis; Screening mammogram, encounter for; Thyroid disease; Vertigo         Past Surgical History  Bladder Surg.; EYE SURGERY; Glaucoma surgery; Hysterectomy         Medication List  amlodipine 5 mg oral tablet; aspirin 81 mg oral tablet,delayed release (DR/EC); carbidopa-levodopa  mg oral tablet; citalopram 20 mg oral tablet; iron 325 mg (65 mg iron) oral tablet; latanoprost 0.005 % ophthalmic (eye) drops; levothyroxine 75 mcg oral tablet; lisinopril 10 mg oral tablet; pramipexole 0.5 mg oral tablet; Pravachol 20 mg oral tablet; Vitamin D3 2,000 unit oral capsule; Women's Daily Multivitamin oral         Allergy List  iodine; SULFA (SULFONAMIDES)         Family Medical History  Family history of Arthritis; Family history of heart disease; Family history of diabetes mellitus         Reproductive History   5 Para 5 0 0 0       Social History  Advance Care Plan/Surrogate Decision Maker scanned into EMR; Alcohol (Never); lives alone; No known infection risk; Tobacco (Never);          Immunizations  Name Date Admin   Influenza 10/01/2020   Influenza 10/01/2019         Review of Systems  · Constitutional  o Denies  o : fever, night sweats, weight loss  · Eyes  o Admits  o : impaired vision  o Denies  o : discharge from eye  · HENT  o Admits  o : *See HPI  · Cardiovascular  o Denies  o : chest pain, irregular heart beats  · Respiratory  o Admits  o : wheezing  o Denies  o : shortness of breath, coughing up blood  · Gastrointestinal  o Admits  o : heartburn, reflux  o Denies  o : vomiting blood  · Genitourinary  o Admits  o : frequency  · Integument  o Admits  o : skin dryness  o Denies  o : rash  · Neurologic  o Admits  o : loss of balance  o Denies  o : seizures, loss of consciousness, dizziness  · Endocrine  o Admits  o : cold  "intolerance, heat intolerance  · Heme-Lymph  o Admits  o : easy bleeding  o Denies  o : anemia      Vitals  Date Time BP Position Site L\R Cuff Size HR RR TEMP (F) WT  HT  BMI kg/m2 BSA m2 O2 Sat FR L/min FiO2        12/14/2020 09:50 AM        97.3 147lbs 2oz 5'  3\" 26.06 1.72             Physical Examination  · Constitutional  o Appearance  o : well developed, well-nourished, alert and in no acute distress, voice clear and strong  · Head and Face  o Head  o :   § Inspection  § : no deformities or lesions  o Face  o :   § Inspection  § : No facial lesions; House-Brackmann I/VI bilaterally  § Palpation  § : No TMJ crepitus nor  muscle tenderness bilaterally  · Eyes  o Vision  o :   § Visual Fields  § : Extraocular movements are intact. No spontaneous or gaze-induced nystagmus.  o Conjunctivae  o : clear  o Sclerae  o : clear  o Pupils and Irises  o : pupils equal, round, and reactive to light.   · Ears, Nose, Mouth and Throat  o Ears  o :   § External Ears  § : appearance within normal limits, no lesions present  § Otoscopic Examination  § : Narrow external auditory canals, bilateral complete cerumen suction and alligator forceps, tympanic membrane appearance within normal limits bilaterally without perforations, well-aerated middle ears  § Hearing  § : intact to conversational voice both ears  o Nose  o :   § External Nose  § : appearance normal  § Intranasal Exam  § : mucosa within normal limits, vestibules normal, no intranasal lesions present, septum midline, sinuses non tender to percussion  o Oral Cavity  o :   § Oral Mucosa  § : oral mucosa normal without pallor or cyanosis  § Lips  § : lip appearance normal  § Teeth  § : normal dentition for age  § Gums  § : gums pink, non-swollen, no bleeding present  § Tongue  § : tongue appearance normal; normal mobility  § Palate  § : hard palate normal, soft palate appearance normal with symmetric mobility  o Throat  o :   § Oropharynx  § : no inflammation or " "lesions present, tonsils within normal limits  · Neck  o Inspection/Palpation  o : normal appearance, no masses or tenderness, trachea midline; thyroid size normal, nontender, no nodules or masses present on palpation  · Respiratory  o Respiratory Effort  o : breathing unlabored  o Inspection of Chest  o : normal appearance, no retractions  · Lymphatic  o Neck  o : no lymphadenopathy present  o Supraclavicular Nodes  o : no lymphadenopathy present  o Preauricular Nodes  o : no lymphadenopathy present  · Skin and Subcutaneous Tissue  o General Inspection  o : Regarding face and neck - there are no rashes present, no lesions present, and no areas of discoloration  · Neurologic  o Cranial Nerves  o : cranial nerves II-XII are grossly intact bilaterally  o Gait and Station  o : normal gait, able to stand without diffculty  · Psychiatric  o Judgement and Insight  o : judgment and insight intact  o Mood and Affect  o : mood normal, affect appropriate          Assessment  · Impacted cerumen, bilateral     380.4/H61.23  · Sensorineural hearing loss (SNHL), bilateral     389.18/H90.3  · Tinnitus, bilateral     388.30/H93.13      Plan  · Orders  o Removal of impacted cerumen (24743) - 380.4/H61.23 - 12/14/2020  o Audiometry, pure-tone (threshold); air and bone (86769) - 389.18/H90.3, 388.30/H93.13 - 12/14/2020  o Tympanogram (Impedance Testing) [facility] (73296) - 389.18/H90.3, 388.30/H93.13 - 12/14/2020  · Medications  o Medications have been Reconciled  o Transition of Care or Provider Policy  · Instructions  o She presents the clinic today for evaluation of her ears and hearing. She reports that over the past few months she has noticed a change in her hearing. She states that she is having trouble with hearing her TV and has to turn it up much louder than she fell just a few months ago. She feels like her ears are \"stopped up\". She reports occasional left-sided otalgia that is short in duration. She denies any history of " recurrent otitis media infections or otorrhea. She has never had any ear surgeries. She does state that she has very narrow ear canals. She reports a longstanding history with bilateral ear ringing. She denies any significant noise exposure history. She also states that over the past 4 months she has felt unsteady on her feet. She denies any vertigo symptoms. She reports that she has had issues with her blood pressure fluctuating from high to low. She has been using a walker at times at home. She denies any falls. She does have very narrowed External auditory canals bilaterally. She has complete cerumen impactions bilaterally. I was able to clear these completely using suction and alligator forceps. Bilateral tympanic membrane appearance is within normal limits. There are not perforations and middle ears are well aerated. She immediately felt as though both her hearing and her balance improved after clearing the ear canals. We did obtain an audiogram and tympanogram.The audiogram shows the right ear with a moderate to severe sensorineural hearing loss above 1000 Hz. The left ear has a moderately severe to severe sensorineural hearing loss above 1000 Hz. Speech reception threshold was at 35 dB bilaterally. Word discrimination scores were 76% on the right and 48% on the left at 75 dB. Tympanograms were normal bilaterally. We have discussed the audiogram with the patient and I have also given her a copy. Given her speech audiometry scores she would be a good candidate for hearing aid especially in the right ear. She states that at this time she feels like she cannot afford a hearing aid. I would like to see her back in 6 months to reassess her ears to see if we need to do cerumen removal again.  o Electronically Identified Patient Education Materials Provided Electronically  · Correspondence  o ENT Letter to Referring MD (Rivka MALHOTRA) - 12/14/2020            Electronically Signed by: ROSCOE Bhatti -Author  on December 14, 2020 11:03:57 AM

## 2021-05-12 NOTE — PROGRESS NOTES
Quick Note      Patient Name: Geovanna Whitten   Patient ID: [id number]   Sex: Female   YOB: 1935    Primary Care Provider: Rivka MALHOTRA   Referring Provider: Rivka MALHOTRA    Visit Date: April 23, 2020    Provider: ROSCOE March   Location: [facility]   Location Address: [address] [place] [address], [address]  [place], [place]  [id number]   Location Phone: [phone]          History Of Present Illness  TELEHEALTH TELEPHONE VISIT  Chief Complaint: 6 month follow up   Geovanna Whitten is a 85 year old /White female who is presenting for evaluation via telehealth telephone visit. Verbal consent obtained before beginning visit.   Provider spent 13 minutes with the patient during telehealth visit.   The following staff were present during this visit: Rivka MALHOTRA and Nadya Davidson MA   Past Medical History/Overview of Patient Symptoms     Pt consented to telehealth visit via phone call. Pt is doing her 6 month follow up. She needs refills at this time, and is not due for lab work.     Pt is still having back pain, but had been PT before the virus started. She had been referred to PT by Dr. Castillo.    DEXA:12/28/2018    Pt is doing well, no other problems.       Physical Examination  · Constitutional  o Appearance  o : alert, in no acute distress  · Neurologic  o Mental Status Examination  o :   § Orientation  § : grossly oriented to person, place and time  · Psychiatric  o Mood and Affect  o : mood normal, affect appropriate, denies any SI/HI          Assessment  · Hyperlipidemia     272.4/E78.5  · Degeneration of lumbar intervertebral disc     722.52/M51.36  · Lumbago/low back pain     724.3/M54.40  · Scoliosis     737.30/M41.9  · Gastroesophageal Reflux Disorder     530.81  · Arthritis     V13.4/V13.4      Plan  · Orders  o Physician Telephone Evaluation, 11-20 minutes (05684) - - 04/23/2020  · Medications  o amlodipine 5 mg oral tablet   SIG: TAKE 1 TABLET BY MOUTH DAILY    DISP: (90) Tablet with 1 refills  Refilled on 04/23/2020     o citalopram 10 mg oral tablet   SIG: take 1 tablet (10 mg) by oral route once daily for 90 days   DISP: (90) tablet with 1 refills  Refilled on 04/23/2020     o levothyroxine 75 mcg oral tablet   SIG: take 1 tablet (75 mcg) by oral route once daily for 90 days   DISP: (90) tablets with 1 refills  Refilled on 04/23/2020     o lisinopril 10 mg oral tablet   SIG: take 1 tablet (10 mg) by oral route once daily for 90 days   DISP: (90) tablet with 1 refills  Refilled on 04/23/2020     o Medications have been Reconciled  o Transition of Care or Provider Policy  · Instructions  o Advised that cheeses and other sources of dairy fats, animal fats, fast food, and the extras (candy, pastries, pies, doughnuts and cookies) all contain LDL raising nutrients. Advised to increase fruits, vegetables, whole grains, and to monitor portion sizes.   o Plan Of Care:   o Take all medications as prescribed/directed.  o Call the office with any concerns or questions.  o pt to resume PT when able  · Disposition  o Return Visit Request in/on 6 months +/- 2 weeks ([id number]).            Electronically Signed by: ROSCOE March -Author on April 23, 2020 08:48:37 AM

## 2021-05-13 NOTE — PROGRESS NOTES
"   Progress Note      Patient Name: Geovanna Whitten   Patient ID: [id number]   Sex: Female   YOB: 1935    Primary Care Provider: Rivka MALHOTRA   Referring Provider: Rivka MALHOTRA    Visit Date: October 26, 2020    Provider: ROSCOE March   Location: [facility]   Location Address: [address] [place] [address], [address]  [place]  [id number]   Location Phone: [phone]          Chief Complaint  · 6 mo f/u      History Of Present Illness  Geovanna Whitten is a 85 year old /White female who presents for evaluation and treatment of:      Pt is a 6 mo f/u. Pt has c/o her celexa being \"too much.\" She states her nerves are bad. She was taking 20mg and it was too much she did better with the 10mg and wonders if she can go back to it.    Pt has c/o right foot swelling. Pt has had xrays with no findings.   She denies any SOA.    Pt has been put on Crestor by Rivka and Dr. Rasmussen. Pt is refusing to take a statin. Pt states it makes her shake. She does not like seeing Dr. Rasmussen as she states she can not understand him and wants to see someone she can understand.    Pt is due labs.    Pt is due Dexa.       Past Medical History  Disease Name Date Onset Notes   Acquired spondylolisthesis , lumbar 01/23/2020 --    Arthritis --  --    Bladder problem --  --    Bone Density Screening 2/28/2018 [place] [place]   Corns and callus --  --    Degeneration of lumbar intervertebral disc 01/23/2020 --    Gastroesophageal Reflux Disorder --  --    Gout --  --    High cholesterol --  --    High cholesterol --  --    Hypertension, Benign Essential --  --    Ingrowing toenail --  --    Leg pain --  --    Leg swelling --  --    Lumbago/low back pain 01/23/2020 --    Migraines --  --    Muscle cramps --  --    Numbness in feet --  --    Pain, Arm --  --    Pain, Joint --  --    Parkinson's Disease --  --    Radiculopathy, lumbosacral 01/23/2020 --    Scoliosis 01/23/2020 --  "   Screening mammogram, encounter for 2020 normal    Thyroid disease --  --          Past Surgical History  Procedure Name Date Notes   Bladder Surg. --  --    EYE SURGERY --  --    Glaucoma surgery --  --    Hysterectomy --  --          Medication List  Name Date Started Instructions   amlodipine 5 mg oral tablet 10/19/2020 TAKE ONE TABLET BY MOUTH DAILY   carbidopa-levodopa  mg oral tablet 2020 TAKE 2.5 TABLETS BY MOUTH FIVE TIMES A DAY for 90 days   citalopram 10 mg oral tablet 10/26/2020 take 1 tablet (10 mg) by oral route once daily in the evening for 90 days   gabapentin 100 mg oral capsule  take 1 capsule (100 mg) by oral route once daily   latanoprost 0.005 % ophthalmic (eye) drops  instill 1 drop into affected eye(s) by ophthalmic route once daily in the evening   levothyroxine 75 mcg oral tablet 2020 take 1 tablet (75 mcg) by oral route once daily for 90 days   Lidoderm 5 % topical adhesive patch,medicated 2019 apply 1 patch by transdermal route once daily (May wear up to 12hours.) for 30 days   lisinopril 10 mg oral tablet 2020 take 1 tablet (10 mg) by oral route once daily for 90 days   pramipexole 0.5 mg oral tablet 2020 take 1 tablet by oral route 2 times a day for 90 days   Vitamin D3 2,000 unit oral capsule  take 1 capsule by oral route daily   Women's Daily Multivitamin oral  --          Allergy List  Allergen Name Date Reaction Notes   iodine --  --  --    SULFA (SULFONAMIDES) --  --  --        Allergies Reconciled  Family Medical History  Disease Name Relative/Age Notes   Family history of Arthritis Mother/   Mother   Family history of heart disease Father/   Father         Reproductive History  Menstrual   Age Menarche: 14   Pregnancy Summary   Total Pregnancies: 5 Full Term: 5 Premature: 0   Ab Induced: 0 Ab Spontaneous: 0 Ectopics: 0   Multiples: 0 Livin         Social History  Finding Status Start/Stop Quantity Notes   Advance Care Plan/Surrogate  "Decision Maker scanned into EMR --  --/-- --  --    Alcohol Never --/-- --  06/18/2020 - 06/18/2020 - does not drink   lives alone --  --/-- --  --    No known infection risk --  --/-- --  --    Tobacco Never --/-- --  06/18/2020 - 06/18/2020 - never smoker    --  --/-- --  --          Immunizations  NameDate Admin Mfg Trade Name Lot Number Route Inj VIS Given VIS Publication   Kyrocwplr24/01/2020 SK[employer]  NE NE     Comments:          Review of Systems  · Constitutional  o Denies  o : fever, fatigue, weight loss, weight gain  · Cardiovascular  o Denies  o : lower extremity edema, claudication, chest pressure, palpitations  · Respiratory  o Denies  o : shortness of breath, wheezing, cough, hemoptysis, dyspnea on exertion  · Gastrointestinal  o Denies  o : nausea, vomiting, diarrhea, constipation, abdominal pain  · Musculoskeletal  o Admits  o : right foot swelling  · Psychiatric  o Admits  o : anxiety, depression      Vitals  Date Time BP Position Site L\R Cuff Size HR RR TEMP (F) WT  HT  BMI kg/m2 BSA m2 O2 Sat FR L/min FiO2 HC       02/25/2020 08:27 /62 Sitting    78 - R   140lbs 0oz 5'  4\" 24.03 1.69 98 %      06/18/2020 10:18 /51 Sitting    68 - R   142lbs 0oz 5'  4\" 24.37 1.71       10/26/2020 10:54 /53 Sitting    79 - R   142lbs 8oz 5'  4\" 24.46 1.71 100 %  21%          Physical Examination  · Constitutional  o Appearance  o : well-nourished, well developed, alert, in no acute distress  · Ears, Nose, Mouth and Throat  o Ears  o :   § External Ears  § : appearance within normal limits, no lesions present  § Otoscopic Examination  § : tympanic membrane appearance within normal limits bilaterally without perforations, mobility normal  o Nose  o :   § External Nose  § : normal stucture noted.  § Intranasal Exam  § : no swelling, reddness, turbs normal shaina.  o Oral Cavity  o :   § Oral Mucosa  § : oral mucosa normal without pallor or cyanosis  § Lips  § : lip appearance " normal  § Teeth  § : normal dentition for age  § Gums  § : gums pink, non-swollen, no bleeding present  § Tongue  § : tongue appearance normal  § Palate  § : hard palate normal, soft palate appearance normal  o Throat  o :   § Oropharynx  § : no inflammation or lesions present, tonsils within normal limits  · Respiratory  o Respiratory Effort  o : breathing unlabored  o Auscultation of Lungs  o : normal breath sounds throughout  · Cardiovascular  o Heart  o :   § Auscultation of Heart  § : regular rate and rhythm, no murmurs, gallops or rubs  § Palpation of Heart  § : normal apical impulse, no cardiac thrill present  o Peripheral Vascular System  o :   § Extremities  § : no cyanosis, clubbing or edema; less than 2 second refill noted  · Gastrointestinal  o Abdominal Examination  o : abdomen nontender to palpation, tone normal without rigidity or guarding, no masses present, abdominal contour scaphoid  o Liver and spleen  o : no hepatomegaly present, liver nontender to palpation, spleen not palpable  · Musculoskeletal  o Right Upper Extremity  o :   § Inspection/Palpation  § : no tenderness to palpation  § Range of Motion  § : range of motion normal, no joint crepitus or pain with motion present  o Left Upper Extremity  o :   § Inspection/Palpation  § : no tenderness to palpation  § Range of Motion  § : range of motion normal, no joint crepitus present, no pain with joint motion  o Right Lower Extremity  o :   § Inspection/Palpation  § : no joint or limb tenderness to palpation, slight swelling noted in right foot, no erythema, no heat and negative Benigno's sign, no tenderness or heat in calf area  § Range of Motion  § : range of motion normal, no joint crepitations present, no pain on motion  o Left Lower Extremity  o :   § Inspection/Palpation  § : no joint or limb tenderness to palpation  § Range of Motion  § : range of motion normal, no joint crepitations present, no pain on motion  · Skin and Subcutaneous  "Tissue  o General Inspection  o : no rashes or lesions present, no areas of discoloration  · Neurologic  o Mental Status Examination  o :   § Orientation  § : grossly oriented to person, place and time  o Cranial Nerves  o : cranial nerves intact and symmetric throughout  · Psychiatric  o Mood and Affect  o : mood normal, affect appropriate, denies any SI/HI          Assessment  · Anxiety disorder     300.00/F41.9  · Depression     311/F32.9  · Essential hypertension     401.9/I10  · Hyperlipidemia     272.4/E78.5  · Hypothyroidism     244.9/E03.9  · Major depressive disorder     296.20/F32.2  · Vitamin D deficiency     268.9/E55.9  · Post menopausal syndrome     V49.81/Z78.0  · Right Foot swelling     729.81/M79.89  · Tremor     781.0/R25.1      Plan  · Orders  o CBC with Auto Diff [facility] (16101) - 401.9/I10 - 10/26/2020  o HTN/Lipid Panel (CMP, Lipid) [facility] (65556, 65224) - 272.4/E78.5 - 10/26/2020  o Thyroid Profile (THYII, 98023, 86407) - 244.9/E03.9 - 10/26/2020  o Vitamin D Level (40302) - 268.9/E55.9 - 10/26/2020  o DEXA Bone Density, 1 or more sites, axial skeleton [facility] (57871) - V49.81/Z78.0 - 10/26/2020  o ACO-39: Current medications updated and reviewed (1159F, ) - - 10/26/2020  o ACO-14: Influenza immunization administered or previously received [facility] () - - 10/26/2020  o VELE (Dop Scan) Unilateral. (50234) - 729.81/M79.89 - 10/26/2020  o Magnesium, serum (46723) - - 10/26/2020  · Medications  o citalopram 10 mg oral tablet   SIG: take 1 tablet (10 mg) by oral route once daily in the evening for 90 days   DISP: (90) Tablet with 1 refills  Adjusted on 10/26/2020     o Medications have been Reconciled  o Transition of Care or Provider Policy  · Instructions  o Patient agrees to a \"No Self Harm\" contract. Patient will either call us, 911, ER, [facility].  o Patient agrees to a \"No Self Harm\" contract. Patient will either call us, 911, ER, [facility].  o Advised that cheeses and other sources of dairy fats, animal fats, fast food, and the extras (candy, pastries, pies, doughnuts and cookies) all contain LDL raising nutrients. Advised to increase fruits, vegetables, whole grains, and to monitor portion sizes.   o Stop taking calcium supplements for at least 48 hours prior to your exam. Failure to stop supplements could alter results, and the radiologists will require you to reschedule your test.  o Patient was educated/instructed on their diagnosis, treatment and medications prior to discharge from the clinic today.  o Patient instructed to seek medical attention urgently for new or worsening symptoms.  o Call the office with any concerns or questions.  · Disposition  o Return Visit Request in/on 6 months +/- 2 weeks (88327).            Electronically Signed by: ROSCOE March -Author on October 26, 2020 11:22:32 AM

## 2021-05-13 NOTE — PROGRESS NOTES
Progress Note      Patient Name: Geovanna Whitten   Patient ID: [id number]   Sex: Female   YOB: 1935    Primary Care Provider: Rivka MALHOTRA   Referring Provider: Rivka MALHOTRA    Visit Date: November 24, 2020    Provider: ROSCOE March   Location: [facility]   Location Address: [address] [place] [address], [address]  [place], [place]  [id number]   Location Phone: [phone]          Chief Complaint  · clogged ears      History Of Present Illness  Video Conferencing Visit  Geovanna Whitten is a 85 year old /White female who is presenting for evaluation via video conferencing via google duo. Verbal consent obtained before beginning visit.   The following staff were present during this visit: ROSCOE March and MARIA R Mathew   Geovanna Whitten is a 85 year old /White female who presents for evaluation and treatment of:      Pt has c/o clogged ears/possible fluid on her hears. She states her balance is off and slightly dizzy and can't hear very well. She states it has been going on or a couple days.    She is also having some dysuria.       Past Medical History  Disease Name Date Onset Notes   Acquired spondylolisthesis , lumbar 01/23/2020 --    Arthritis --  --    Bladder problem --  --    Bone Density Screening 2/28/2018 Charmaine Joshua   Corns and callus --  --    Degeneration of lumbar intervertebral disc 01/23/2020 --    Gastroesophageal Reflux Disorder --  --    Gout --  --    High cholesterol --  --    High cholesterol --  --    Hypertension, Benign Essential --  --    Ingrowing toenail --  --    Leg pain --  --    Leg swelling --  --    Lumbago/low back pain 01/23/2020 --    Migraines --  --    Muscle cramps --  --    Numbness in feet --  --    Pain, Arm --  --    Pain, Joint --  --    Parkinson's Disease --  --    Radiculopathy, lumbosacral 01/23/2020 --    Scoliosis 01/23/2020 --    Screening mammogram, encounter for 7/2/2020 normal     Thyroid disease --  --          Past Surgical History  Procedure Name Date Notes   Bladder Surg. --  --    EYE SURGERY --  --    Glaucoma surgery --  --    Hysterectomy --  --          Medication List  Name Date Started Instructions   amlodipine 5 mg oral tablet 10/19/2020 TAKE ONE TABLET BY MOUTH DAILY   carbidopa-levodopa  mg oral tablet 2020 TAKE 2.5 TABLETS BY MOUTH FIVE TIMES A DAY for 90 days   citalopram 10 mg oral tablet 10/26/2020 take 1 tablet (10 mg) by oral route once daily in the evening for 90 days   latanoprost 0.005 % ophthalmic (eye) drops  instill 1 drop into affected eye(s) by ophthalmic route once daily in the evening   levothyroxine 75 mcg oral tablet 2020 take 1 tablet (75 mcg) by oral route once daily for 90 days   lisinopril 10 mg oral tablet 2020 take 1 tablet (10 mg) by oral route once daily for 90 days   pramipexole 0.5 mg oral tablet 2020 take 1 tablet by oral route 2 times a day for 90 days   Vitamin D3 2,000 unit oral capsule  take 1 capsule by oral route daily   Women's Daily Multivitamin oral  --          Allergy List  Allergen Name Date Reaction Notes   iodine --  --  --    SULFA (SULFONAMIDES) --  --  --        Allergies Reconciled  Family Medical History  Disease Name Relative/Age Notes   Family history of Arthritis Mother/   Mother   Family history of heart disease Father/   Father         Reproductive History  Menstrual   Age Menarche: 14   Pregnancy Summary   Total Pregnancies: 5 Full Term: 5 Premature: 0   Ab Induced: 0 Ab Spontaneous: 0 Ectopics: 0   Multiples: 0 Livin         Social History  Finding Status Start/Stop Quantity Notes   Advance Care Plan/Surrogate Decision Maker scanned into EMR --  --/-- --  --    Alcohol Never --/-- --  2020 - 2020 - does not drink   lives alone --  --/-- --  --    No known infection risk --  --/-- --  --    Tobacco Never --/-- --  2020 - 2020 - never smoker    --  --/-- --  --  "         Immunizations  NameDate Admin Mfg Trade Name Lot Number Route Inj VIS Given VIS Publication   [id number]/01/2020 SK[employer] Fluzone [employer]riv[employer]  NE NE     Comments:          Review of Systems  · Constitutional  o Denies  o : fever, fatigue, weight loss, weight gain  · HENT  o Admits  o : vertigo, ear fullness  · Cardiovascular  o Denies  o : lower extremity edema, claudication, chest pressure, palpitations  · Respiratory  o Denies  o : shortness of breath, wheezing, cough, hemoptysis, dyspnea on exertion  · Gastrointestinal  o Denies  o : nausea, vomiting, diarrhea, constipation, abdominal pain  · Genitourinary  o Admits  o : dysuria      Vitals  Date Time BP Position Site L\R Cuff Size HR RR TEMP (F) WT  HT  BMI kg/m2 BSA m2 O2 Sat FR L/min FiO2        11/24/2020 11:35 /48 Sitting    70 - R   141lbs 0oz 5'  4\" 24.2 1.7             Physical Examination  · Constitutional  o Appearance  o : well-nourished, well developed, alert, in no acute distress  · Neurologic  o Mental Status Examination  o :   § Orientation  § : grossly oriented to person, place and time  · Psychiatric  o Mood and Affect  o : mood normal, affect appropriate          Assessment  · Anxiety disorder     300.00/F41.9  · Essential hypertension     401.9/I10  · Hyperlipidemia     272.4/E78.5  · Hypothyroidism     244.9/E03.9  · Decreased hearing of both ears     389.9/H91.93  · Dysuria     788.1/R30.0  · Vertigo     780.4/R42  · Acquired spondylolisthesis , lumbar     738.4/M43.19  · Degeneration of lumbar intervertebral disc     722.52/M51.36  · Lumbago/low back pain     724.3/M54.40  · Radiculopathy, lumbosacral     724.4/M54.16  · Scoliosis     737.30/M41.9  · Gastroesophageal Reflux Disorder     530.81  · High cholesterol     272.0/E78.0  · Pain, Arm     729.5  · Pain, Joint     719.49  · Arthritis     V13.4/V13.4  · Corns and callus     700/L84  · Gout     274.9/M10.9  · Ingrowing " toenail     703.0/L60.0  · Tremor     781.0/R25.1      Plan  · Orders  o ACO-39: Current medications updated and reviewed (, 1159F) - - 11/24/2020  o Urinalysis with Reflex Microscopy if abnormal ([facility]) (90648) - - 11/24/2020  o Audiology Consultation (AUDIO) - - 11/24/2020  · Medications  o Medications have been Reconciled  o Transition of Care or Provider Policy  · Instructions  o Advised that cheeses and other sources of dairy fats, animal fats, fast food, and the extras (candy, pastries, pies, doughnuts and cookies) all contain LDL raising nutrients. Advised to increase fruits, vegetables, whole grains, and to monitor portion sizes.   o Patient was educated/instructed on their diagnosis, treatment and medications prior to discharge from the clinic today.  o Patient instructed to seek medical attention urgently for new or worsening symptoms.  o Call the office with any concerns or questions.  o will fax order over to lab for pt to have UA done to r/o UTI  · Disposition  o Call or Return if symptoms worsen or persist.            Electronically Signed by: ROSCOE March -Author on November 24, 2020 03:21:33 PM

## 2021-05-13 NOTE — PROGRESS NOTES
Progress Note      Patient Name: Geovanna Whitten   Patient ID: [id number]   Sex: Female   YOB: 1935    Primary Care Provider: Rivka MALHOTRA   Referring Provider: Rivka MALHOTRA    Visit Date: November 23, 2020    Provider: Sudheer Day DPM   Location: [facility]   Location Address: [address] [place] [address]  [id number]   Location Phone: [phone]          Chief Complaint  · Painful Toenails      History Of Present Illness  Geovanna Whitten is a new patient in our office and complains of painful, elongated toenails which are thickened, yellowed, chalky, and cause pain with shoe gear and ambulation.      New, Established, New Problem: established   Location: Toenails  Duration: Greater than five years  Onset: Gradual  Nature: sore with palpation.  Stable, worsening, improving: Worsening  Aggravating factors: Pain with shoe gear and ambulation.  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    Patient denies any fevers, chills, nausea, vomiting, shortness of breathe, nor any other constitutional signs nor symptoms.       Past Medical History  Acquired spondylolisthesis , lumbar; Arthritis; Bladder problem; Bone Density Screening; Corns and callus; Degeneration of lumbar intervertebral disc; Gastroesophageal Reflux Disorder; Gout; High cholesterol; High cholesterol; Hypertension, Benign Essential; Ingrowing toenail; Leg pain; Leg swelling; Lumbago/low back pain; Migraines; Muscle cramps; Numbness in feet; Pain, Arm; Pain, Joint; Parkinson's Disease; Radiculopathy, lumbosacral; Scoliosis; Screening mammogram, encounter for; Thyroid disease         Past Surgical History  Bladder Surg.; EYE SURGERY; Glaucoma surgery; Hysterectomy         Medication List  amlodipine 5 mg oral tablet; carbidopa-levodopa  mg oral tablet; citalopram 10 mg oral  "tablet; latanoprost 0.005 % ophthalmic (eye) drops; levothyroxine 75 mcg oral tablet; lisinopril 10 mg oral tablet; pramipexole 0.5 mg oral tablet; Vitamin D3 2,000 unit oral capsule; Women's Daily Multivitamin oral         Allergy List  iodine; SULFA (SULFONAMIDES)       Allergies Reconciled  Family Medical History  Family history of Arthritis; Family history of heart disease         Reproductive History   5 Para 5 0 0 0       Social History  Advance Care Plan/Surrogate Decision Maker scanned into EMR; Alcohol (Never); lives alone; No known infection risk; Tobacco (Never);          Immunizations  Name Date Admin   Influenza 10/01/2020   Influenza 10/01/2019         Review of Systems  · Constitutional  o Denies  o : fever, chills  · Eyes  o Denies  o : double vision  · HENT  o Denies  o : vertigo, recent head injury, hearing loss or ringing  · Cardiovascular  o Denies  o : chest pain, irregular heart beats  · Gastrointestinal  o Denies  o : nausea, vomiting  · Neurologic  o Denies  o : altered mental status, tingling or numbness, seizures  · Musculoskeletal  o Denies  o : joint pain, joint swelling, limitation of motion      Vitals  Date Time BP Position Site L\R Cuff Size HR RR TEMP (F) WT  HT  BMI kg/m2 BSA m2 O2 Sat FR L/min FiO2        2020 02:53 /54 Sitting    81 - R  97.6 143lbs 8oz 5'  4\" 24.63 1.71 96 %            Physical Examination  · Constitutional  o Appearance  o : well-nourished, well developed, no obvious deformities present, appears to be chronically ill   · Respiratory  o Respiratory Effort  o : No labored breathing. Good respiratory effort.   · Cardiovascular  o Peripheral Vascular System  o :   § Pedal Pulses  § : Pedal Pulses are 2+ and symmetrical  § Extremities  § : There is no edema of the lower extremities  · Musculoskeletal  o Extremeties/Joint  o : Lower extremity muscle strength and range of motion is equal and symmetrical bilaterally. The knees are noted to be " in normal alignment. Ankle alignment and range of motion is normal and foot structure is normal.  · Neurologic  o Sensation  o : Sharp/dull sensation is within normal limits bilaterally. Monofilament sensation examination of the left foot is normal. Monofilament sensation examination of the right foot is normal.  · Toes  o Toes: Right Foot  o :   § Toenails  § : Toenails are hypertrophic, mycotc, dystrophic, thickened, with sublingual debris, incurvated, brittle toenail(s) at nail-1, 2, 3, 4, 5, Oncholysis of the foot   · Procedures  o Nail Debridement  o : Nail debridement is indicated for the following toenails:-right hallux, right 2nd toe, right 3rd toe, right 4th toe, right 5th toe, The nail was debrided of excessive thickness to appropriate levels of comfort and contour using nail nippers. The procedure was without complications          Assessment  · Foot pain, right     729.5/M79.671  · Ingrowing nail     703.0/L60.0  · Tinea unguium     110.1/B35.1  · Tremor     781.0/R25.1      Plan  · Orders  o Debridement of one to five nails (98965) - 729.5/M79.671, 703.0/L60.0, 110.1/B35.1 - 11/23/2020  o ACO-39: Current medications updated and reviewed () - 729.5/M79.671, 703.0/L60.0, 110.1/B35.1 - 11/23/2020  · Medications  o Medications have been Reconciled  o Transition of Care or Provider Policy  · Instructions  o Follow Up in 9 weeks  o I have discussed the findings of this evaluation with the patient. The discussion included a complete verbal explanation of any changes in the examination results, diagnosis, and the current treatment plan. A schedule for future care needs was explained. If any questions should arise after returning home, I have encouraged the patient to feel free to contact Dr. Day. The patient states understanding and agreement with this plan.   o Pt to monitor for problems and to contact Dr. Day for follow-up should such signs occur. Patient states understanding and agreement with  this plan.   o Onychomycosis is present in 2-3% of the population with the most common source of contamination coming from the patient's own skin. Fifteen to 20 percent of people between the ages of 40 and 60 have onychomycosis, 32 percent of 60- to [id number] have nail fungus and approximately 50 percent of those over 70 are afflicted. Seventy-five percent of the people who have this infection exhibit psychosocial concerns. These people face the dilemma of not being able to go to the swimming pool, public shower areas or even wear open-toed sandals. More important is the fact that 48 percent of the people with onychomycosis have pain. The pain is intense enough to have these patients miss 1.8 days of work on average over a six-month period. Traditional topical therapies used alone are generally ineffective for clearing the primary infection, and even oral therapy is associated with a high rate of initial treatment failure or recurrence. In many patients combination oral and topical therapy is the treatment of choice.   o I have recommended debridement of the devitalized or contaminated tissue. Debridement will relieve the pressure of the necrotic presence of on the nail and provides for a better cosmetic appearance. Debulking the nail does help in combination with other treatments in that it can decrease the fungal load of the nail itself.   o Electronically Identified Patient Education Materials Provided Electronically  · Disposition  o Call or Return if symptoms worsen or persist.            Electronically Signed by: Sudheer Day DPM -Author on November 23, 2020 03:05:33 PM

## 2021-05-13 NOTE — PROGRESS NOTES
Progress Note      Patient Name: Geovanna Whitten   Patient ID: [id number]   Sex: Female   YOB: 1935    Primary Care Provider: Rivka MALHOTRA   Referring Provider: Rivka MALHOTRA    Visit Date: June 18, 2020    Provider: Amador Morton MD   Location: [facility]   Location Address: [address] [place] [address]  [id number]   Location Phone: [phone]          Chief Complaint     F/u Parkinson's/ back pain. Prior pt of Dr. Patten.       History Of Present Illness  Geovanna Whitten is a 85 year old /White female who presents today to [facility] today referred from Rivka MALHOTRA.      85-year-old woman here for follow-up for Parkinson's.  She states that she was diagnosed to have Parkinson's disease by Dr. Page 5 years ago patient's been taking Sinemet since that time.  She is taking carbidopa/levodopa 25/100 half tablets 4 times a day and Mirapex 3 times a day.  She takes half tablet twice a day and then a full tablet at bedtime.  She states that she lives by herself.  She is independent with activities of daily living.  Other than the right-sided tremor there is no problems with bradykinesia, activities of daily living such as bathing, dressing, toileting, will walking, driving.  She states that at times she is off balance.  She has a walker at home and she does not use it.  She also does not use a cane.    She does not have any symptoms from behavior disorder.       Past Medical History  Acquired spondylolisthesis , lumbar; Arthritis; Bladder problem; Bone Density Screening; Corns and callus; Degeneration of lumbar intervertebral disc; Gastroesophageal Reflux Disorder; Gout; High cholesterol; High cholesterol; Hypertension, Benign Essential; Ingrowing toenail; Leg pain; Leg swelling; Lumbago/low back pain; Migraines; Muscle cramps; Numbness in feet; Pain, Arm; Pain, Joint; Parkinson's Disease; Radiculopathy, lumbosacral; Scoliosis; Thyroid  disease         Past Surgical History  Bladder Surg.; EYE SURGERY; Glaucoma surgery; Hysterectomy         Medication List  amlodipine 5 mg oral tablet; carbidopa-levodopa  mg oral tablet; citalopram 10 mg oral tablet; latanoprost 0.005 % ophthalmic (eye) drops; levothyroxine 75 mcg oral tablet; Lidoderm 5 % topical adhesive patch,medicated; lisinopril 10 mg oral tablet; pramipexole 0.5 mg oral tablet; Vitamin D3 2,000 unit oral capsule; Women's Daily Multivitamin oral         Allergy List  iodine; SULFA (SULFONAMIDES)         Family Medical History  Family history of Arthritis; Family history of heart disease         Reproductive History   5 Para 5 0 0 0       Social History  Advance Care Plan/Surrogate Decision Maker scanned into EMR; Alcohol (Never); lives alone; No known infection risk; Tobacco (Never);          Immunizations  Name Date Admin   Influenza          Review of Systems  · Constitutional  o Denies  o : chills, excessive sweating, fatigue, fever, sycope/passing out, weight gain, weight loss  · Eyes  o Denies  o : changes in vision, blurry vision, double vision  · HENT  o Denies  o : loss of hearing, ringing in the ears, ear aches, sore throat, nasal congestion, sinus pain, nose bleeds, seasonal allergies  · Cardiovascular  o Denies  o : blood clots, swollen legs, anemia, easy burising or bleeding, transfusions  · Respiratory  o Denies  o : shortness of breath, dry cough, productive cough, pneumonia, COPD  · Gastrointestinal  o Denies  o : difficulty swallowing, reflux  · Genitourinary  o Denies  o : incontinence  · Neurologic  o Admits  o : tremor  o Denies  o : headache, seizure, stroke, loss of balance, falls, dizziness/vertigo, difficulty with sleep, numbness/tingling/paresthesia , difficulty with coordination, difficulty with dexterity, weakness  · Musculoskeletal  o Admits  o : low back pain  o Denies  o : neck stiffness/pain, swollen lymph nodes, muscle aches, joint pain,  "weakness, spasms, sciatica, pain radiating in arm, pain radiating in leg  · Endocrine  o Denies  o : diabetes, thyroid disorder  · Psychiatric  o Denies  o : anxiety, depression      Vitals  Date Time BP Position Site L\R Cuff Size HR RR TEMP (F) WT  HT  BMI kg/m2 BSA m2 O2 Sat HC       06/18/2020 09:57 AM        96.9         06/18/2020 10:18 /51 Sitting    68 - R   142lbs 0oz 5'  4\" 24.37 1.71           Physical Examination     She is alert, fluent, phasic, follows commands well.  There is no facial bradykinesia no difficulty with movements of her upper or lower extremities.  She has a tremor noted in the right arm and leg intermittently greater in the leg.  There is no rigidity or cogwheeling.  Station gait she is able to tiptoe, heel walk, panda without any difficulty.  Armswing is normal and turning is intact.  She can walk with a fast brisk gait without any abnormalities.  She uses her balance after 3 steps with tandem.  She is unable to stand up on either leg for more than 2 seconds.  Heart is regular rhythm normal in rate.           Assessment  · Gait disturbance     781.2/R26.9  I told her that her gait abnormality is not secondary to the Parkinson's but from proprioceptive defects. She is to hold onto objects to avoid falls and if she walks long distances and nothing to hold on she is to use her cane or her walker to reduce her probability of falls.    25 minutes was spent for this moderate complexity visit more than half the time spent face-to-face with the patient for examination, counseling, planning and recommendations.  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 I do not see any difficulty with activities of daily living other than she has a parkinsonian tremor. She is again taking the same medications at this time and I will see her again in 1 year's time for follow-up.    Problems " Reconciled  Plan  · Medications  o Medications have been Reconciled  o Transition of Care or Provider Policy  · Instructions  o Encouraged to follow-up with Primary Care Provider for preventative care.            Electronically Signed by: Amador Morton MD -Author on June 18, 2020 11:04:36 AM

## 2021-05-13 NOTE — PROGRESS NOTES
"   Progress Note      Patient Name: Geovanna Whitten   Patient ID: [id number]   Sex: Female   YOB: 1935    Primary Care Provider: Rivka MALHOTRA   Referring Provider: Rivka MALHOTRA    Visit Date: September 8, 2020    Provider: ROSCOE March   [facility]   Location Address: [address] [place] [address], [address]  [place], [place]  [id number]   Location Phone: [phone]          Chief Complaint  · right foot pain      History Of Present Illness  Video Conferencing Visit  Geovanna Whitten is a 85 year old /White female who is presenting for evaluation via video conferencing via google duo. Verbal consent obtained before beginning visit.   The following staff were present during this visit: ROSCOE March and MARIA R Mathew   Geovanna Whitten is a 85 year old /White female who presents for evaluation and treatment of:      Pt has c/o right foot pain. Pt states she fell in the shower 3-4 weeks ago. She had bruising and swelling. Pt states the bruising has subsided but the swelling and pain is still there. Pt has taken Tylenol and Advil for the pain and swelling with no relief.     Pt states she started Gabapentin 100 mg QHS approximately 6 weeks ago. Pt states Dr. Castillo prescribed the RX. Pt is supposed to take it TID, but c/o fatigue and \"feeling out of it,\" so she only takes it at night.     Pt would like to discuss increasing Celexa to BID.       Past Medical History  Disease Name Date Onset Notes   Acquired spondylolisthesis , lumbar 01/23/2020 --    Arthritis --  --    Bladder problem --  --    Bone Density Screening 2/28/2018 [facility] [place]   Corns and callus --  --    Degeneration of lumbar intervertebral disc 01/23/2020 --    Gastroesophageal Reflux Disorder --  --    Gout --  --    High cholesterol --  --    High cholesterol --  --    Hypertension, Benign Essential --  --    Ingrowing toenail --  --    Leg pain --  --    Leg swelling " --  --    Lumbago/low back pain 01/23/2020 --    Migraines --  --    Muscle cramps --  --    Numbness in feet --  --    Pain, Arm --  --    Pain, Joint --  --    Parkinson's Disease --  --    Radiculopathy, lumbosacral 01/23/2020 --    Scoliosis 01/23/2020 --    Screening mammogram, encounter for 7/2/2020 normal    Thyroid disease --  --          Past Surgical History  Procedure Name Date Notes   Bladder Surg. --  --    EYE SURGERY --  --    Glaucoma surgery --  --    Hysterectomy --  --          Medication List  Name Date Started Instructions   amlodipine 5 mg oral tablet 04/23/2020 TAKE 1 TABLET BY MOUTH DAILY   carbidopa-levodopa  mg oral tablet 06/18/2020 TAKE 2.5 TABLETS BY MOUTH FIVE TIMES A DAY for 90 days   citalopram 20 mg oral tablet 09/08/2020 take 1 tablet (20 mg) by oral route once daily in the morning for 90 days   gabapentin 100 mg oral capsule  take 1 capsule (100 mg) by oral route once daily   latanoprost 0.005 % ophthalmic (eye) drops  instill 1 drop into affected eye(s) by ophthalmic route once daily in the evening   levothyroxine 75 mcg oral tablet 04/23/2020 take 1 tablet (75 mcg) by oral route once daily for 90 days   Lidoderm 5 % topical adhesive patch,medicated 11/21/2019 apply 1 patch by transdermal route once daily (May wear up to 12hours.) for 30 days   lisinopril 10 mg oral tablet 04/23/2020 take 1 tablet (10 mg) by oral route once daily for 90 days   pramipexole 0.5 mg oral tablet 06/18/2020 take 1 tablet by oral route 2 times a day for 90 days   Vitamin D3 2,000 unit oral capsule  take 1 capsule by oral route daily   Women's Daily Multivitamin oral  --          Allergy List  Allergen Name Date Reaction Notes   iodine --  --  --    SULFA (SULFONAMIDES) --  --  --          Family Medical History  Disease Name Relative/Age Notes   Family history of Arthritis Mother/   Mother   Family history of heart disease Father/   Father         Reproductive History  Menstrual   Age Menarche: 14    Pregnancy Summary   Total Pregnancies: 5 Full Term: 5 Premature: 0   Ab Induced: 0 Ab Spontaneous: 0 Ectopics: 0   Multiples: 0 Livin         Social History  Finding Status Start/Stop Quantity Notes   Advance Care Plan/Surrogate Decision Maker scanned into EMR --  --/-- --  --    Alcohol Never --/-- --  2020 - 2020 - does not drink   lives alone --  --/-- --  --    No known infection risk --  --/-- --  --    Tobacco Never --/-- --  2020 - 2020 - never smoker    --  --/-- --  --          Immunizations  NameDate Admin Mfg Trade Name Lot Number Route Inj VIS Given VIS Publication   [id number] NE Not Entered  NE NE     Comments: Pt reported         Review of Systems  · Constitutional  o Denies  o : fever, fatigue, weight loss, weight gain  · Cardiovascular  o Denies  o : lower extremity edema, claudication, chest pressure, palpitations  · Respiratory  o Denies  o : shortness of breath, wheezing, cough, hemoptysis, dyspnea on exertion  · Gastrointestinal  o Denies  o : nausea, vomiting, diarrhea, constipation, abdominal pain  · Musculoskeletal  o Admits  o : ankle pain, foot pain on the right  · Psychiatric  o Admits  o : anxiety, depression      Physical Examination  · Constitutional  o Appearance  o : well-nourished, well developed, alert, in no acute distress  · Neurologic  o Mental Status Examination  o :   § Orientation  § : grossly oriented to person, place and time  · Psychiatric  o Mood and Affect  o : mood normal, affect appropriate, denies any SI/HI          Assessment  · Anxiety disorder     300.00/F41.9  · Depression     311/F32.9  · Essential hypertension     401.9/I10  · GERD (gastroesophageal reflux disease)     530.81/K21.9  · Hyperlipidemia     272.4/E78.5  · Vitamin D deficiency     268.9/E55.9  · Arthritis     V13.4/V13.4  · Right foot pain     729.5/M79.671  · Swelling of right foot     729.81/M79.89      Plan  · Orders  o ACO-39: Current medications  "updated and reviewed () - - 09/08/2020  o Xray foot right [facility] ([id number]) - - 09/08/2020  o Xray ankle right [facility] ([id number]) - - 09/08/2020  · Medications  o citalopram 20 mg oral tablet   SIG: take 1 tablet (20 mg) by oral route once daily in the morning for 90 days   DISP: (90) tablets with 1 refills  Adjusted on 09/08/2020     o Medications have been Reconciled  o Transition of Care or Provider Policy  · Instructions  o Patient was given an SSRI/SSNRI medication and warned of possible side effects of the medication including potential for increased risk of suicidal thoughts and feelings. Patient was instructed that if they begin to exhibit any of these effects they will discontinue the medication immediately and contact our office or the ER ASAP.  o Patient agrees to a \"No Self Harm\" contract. Patient will either call us, 91, , [facility], [facility].  o Patient was given an SSRI/SSNRI medication and warned of possible side effects of the medication including potential for increased risk of suicidal thoughts and feelings. Patient was instructed that if they begin to exhibit any of these effects they will discontinue the medication immediately and contact our office or the  ASAP.  o Patient agrees to a \"No Self Harm\" contract. Patient will either call us, 911, ER, [facility], [facility].  o Advised that cheeses and other sources of dairy fats, animal fats, fast food, and the extras (candy, pastries, pies, doughnuts and cookies) all contain LDL raising nutrients. Advised to increase fruits, vegetables, whole grains, and to monitor portion sizes.   o Patient was educated/instructed on their diagnosis, treatment and medications prior to discharge from the clinic today.  o Patient instructed to seek medical attention urgently for new or worsening symptoms.  o Call the office with any concerns or " questions.  · Disposition  o Call or Return if symptoms worsen or persist.            Electronically Signed by: ROSCOE March -Author on September 8, 2020 10:21:07 AM

## 2021-05-14 VITALS
BODY MASS INDEX: 26.13 KG/M2 | OXYGEN SATURATION: 99 % | SYSTOLIC BLOOD PRESSURE: 144 MMHG | WEIGHT: 142 LBS | DIASTOLIC BLOOD PRESSURE: 51 MMHG | HEIGHT: 62 IN | HEART RATE: 67 BPM

## 2021-05-14 VITALS
TEMPERATURE: 97.6 F | HEIGHT: 64 IN | HEART RATE: 81 BPM | WEIGHT: 143.5 LBS | OXYGEN SATURATION: 96 % | BODY MASS INDEX: 24.5 KG/M2 | SYSTOLIC BLOOD PRESSURE: 140 MMHG | DIASTOLIC BLOOD PRESSURE: 54 MMHG

## 2021-05-14 VITALS
BODY MASS INDEX: 24.41 KG/M2 | WEIGHT: 143 LBS | OXYGEN SATURATION: 100 % | HEIGHT: 64 IN | SYSTOLIC BLOOD PRESSURE: 171 MMHG | HEART RATE: 72 BPM | DIASTOLIC BLOOD PRESSURE: 53 MMHG

## 2021-05-14 VITALS
OXYGEN SATURATION: 100 % | DIASTOLIC BLOOD PRESSURE: 53 MMHG | HEIGHT: 64 IN | WEIGHT: 142.5 LBS | HEART RATE: 79 BPM | BODY MASS INDEX: 24.33 KG/M2 | SYSTOLIC BLOOD PRESSURE: 164 MMHG

## 2021-05-14 VITALS — HEIGHT: 63 IN | HEART RATE: 78 BPM | OXYGEN SATURATION: 98 % | WEIGHT: 147 LBS | BODY MASS INDEX: 26.05 KG/M2

## 2021-05-14 VITALS
WEIGHT: 143 LBS | HEIGHT: 64 IN | HEART RATE: 75 BPM | DIASTOLIC BLOOD PRESSURE: 63 MMHG | SYSTOLIC BLOOD PRESSURE: 171 MMHG | TEMPERATURE: 97.3 F | OXYGEN SATURATION: 99 % | BODY MASS INDEX: 24.41 KG/M2

## 2021-05-14 VITALS
WEIGHT: 141 LBS | DIASTOLIC BLOOD PRESSURE: 48 MMHG | HEIGHT: 64 IN | HEART RATE: 70 BPM | SYSTOLIC BLOOD PRESSURE: 140 MMHG | BODY MASS INDEX: 24.07 KG/M2

## 2021-05-14 VITALS — TEMPERATURE: 97.3 F | HEIGHT: 63 IN | BODY MASS INDEX: 26.07 KG/M2 | WEIGHT: 147.12 LBS

## 2021-05-14 NOTE — PROGRESS NOTES
Progress Note      Patient Name: Geovanna Whitten   Patient ID: [id number]   Sex: Female   YOB: 1935    Primary Care Provider: Rivka MALHOTRA   Referring Provider: Rivka MALHOTRA    Visit Date: April 12, 2021    Provider: ROSCOE March   Location: [facility]   Location Address: [address] [place] [address], [address]  [place], [place]  [id number]   Location Phone: [phone]          Chief Complaint  · Possible UTI      History Of Present Illness  Geovanna Whitten is a 86 year old /White female who presents for evaluation and treatment of:      Pt has c/o possible UTI. Pt is having burning, itching, frequency, and odorous urine. Pt states s/s have been present for approximately 2 weeks. Pt has not tried taking any OTC medications.     PT was seen by podiatry for left foot pain but pt states she did not care for Dr. Pettit he did not address her pain and she wants to be referred to another  podiatrist.    Pt has a f/u 4/29/21 and will have MAW visit.       Past Medical History  Disease Name Date Onset Notes   Acquired spondylolisthesis , lumbar 01/23/2020 --    Arthritis --  --    Bladder disorder --  --    Bladder problem --  --    Bone Density Screening 2/28/2018 [facility] [place]   Corns and callus --  --    Degeneration of lumbar intervertebral disc 01/23/2020 --    Gastroesophageal Reflux Disorder --  --    Gout --  --    Hearing decreased --  --    High cholesterol --  --    High cholesterol --  --    Hyperlipemia --  --    Hypertension --  --    Hypertension, Benign Essential --  --    Ingrowing toenail --  --    Leg pain --  --    Leg swelling --  --    Limb Swelling --  --    Lumbago/low back pain 01/23/2020 --    Migraines --  --    Muscle cramps --  --    Numbness in feet --  --    Pain, Arm --  --    Pain, Joint --  --    Parkinson's Disease --  --    Radiculopathy, lumbosacral 01/23/2020 --    Reflux --  --    Scoliosis 01/23/2020 --    Screening mammogram,  encounter for 7/2/2020 normal    Thyroid disease --  --    Thyroid disorder --  --    Vertigo --  --          Past Surgical History  Procedure Name Date Notes   Bladder Surg. --  --    Colonoscopy --  --    EYE SURGERY --  --    Glaucoma surgery --  --    Hysterectomy --  --          Medication List  Name Date Started Instructions   amlodipine 5 mg oral tablet 10/19/2020 TAKE ONE TABLET BY MOUTH DAILY   aspirin 81 mg oral tablet,delayed release (DR/EC)  take 1 tablet (81 mg) by oral route once daily   Calcium 600 600 mg calcium (1,500 mg) oral tablet  take 1 tablet by oral route daily   carbidopa-levodopa  mg oral tablet 06/18/2020 TAKE 2.5 TABLETS BY MOUTH FIVE TIMES A DAY for 90 days   citalopram 20 mg oral tablet 03/08/2021 TAKE ONE TABLET BY MOUTH EVERY MORNING   iron 325 mg (65 mg iron) oral tablet  take 1 tablet (325 mg) by oral route once daily   latanoprost 0.005 % ophthalmic (eye) drops  instill 1 drop into affected eye(s) by ophthalmic route once daily in the evening   levothyroxine 75 mcg oral tablet 01/18/2021 TAKE ONE TABLET BY MOUTH DAILY   lisinopril 10 mg oral tablet 04/23/2020 take 1 tablet (10 mg) by oral route once daily for 90 days   pramipexole 0.5 mg oral tablet 06/18/2020 take 1 tablet by oral route 2 times a day for 90 days   Pravachol 20 mg oral tablet  take 1/2 tablet by oral route QD   pravastatin 20 mg oral tablet  take 1 tablet (20 mg) by oral route once daily   tramadol 50 mg oral tablet  take 1 tablet by oral route daily   Ultram 50 mg oral tablet  take 1-2 tablets by oral route every 4 to 6 hours   Vitamin D3 2,000 unit oral capsule  take 1 capsule by oral route daily   Women's Daily Multivitamin oral  --          Allergy List  Allergen Name Date Reaction Notes   iodine --  --  --    SULFA (SULFONAMIDES) --  --  --          Family Medical History  Disease Name Relative/Age Notes   Stroke Mother/   Mother   Heart Disease Father/   Father   Family history of Arthritis Mother/    "Mother   Family history of heart disease Father/   Father   Family history of diabetes mellitus Grandmother (maternal)/   --          Reproductive History  Menstrual   Age Menarche: 14   Pregnancy Summary   Total Pregnancies: 5 Full Term: 5 Premature: 0   Ab Induced: 0 Ab Spontaneous: 0 Ectopics: 0   Multiples: 0 Livin         Social History  Finding Status Start/Stop Quantity Notes   Advance Care Plan/Surrogate Decision Maker scanned into EMR --  --/-- --  --    Alcohol Never --/-- --  2020 - 2020 - does not drink   Alcohol Use Never --/-- --  does not drink   lives alone --  --/-- --  --    No known infection risk --  --/-- --  --    Recreational Drug Use Never --/-- --  no   Retired. --  --/-- --  --    Tobacco Never --/-- --  never smoker  2020 - 2020 - never smoker    --  --/-- --  --     --  --/-- --  --          Immunizations  NameDate Admin Mfg Trade Name Lot Number Route Inj VIS Given VIS Publication   [id number] [employer]  NE NE     Comments:          Review of Systems  · Constitutional  o Denies  o : fever, fatigue, weight loss, weight gain  · Cardiovascular  o Denies  o : lower extremity edema, claudication, chest pressure, palpitations  · Respiratory  o Denies  o : shortness of breath, wheezing, cough, hemoptysis, dyspnea on exertion  · Gastrointestinal  o Denies  o : nausea, vomiting, diarrhea, constipation, abdominal pain  · Genitourinary  o Admits  o : urgency, frequency, dysuria  · Psychiatric  o Admits  o : anxiety, depression      Vitals  Date Time BP Position Site L\R Cuff Size HR RR TEMP (F) WT  HT  BMI kg/m2 BSA m2 O2 Sat FR L/min FiO2 HC       2021 03:08 /51 Sitting    67 - R   142lbs 0oz 5'  2\" 25.97 1.68 99 %  21%          Physical Examination  · Constitutional  o Appearance  o : well-nourished, well developed, alert, in no acute distress  · Respiratory  o Respiratory Effort  o : breathing unlabored  o Auscultation " of Lungs  o : normal breath sounds throughout  · Cardiovascular  o Heart  o :   § Auscultation of Heart  § : regular rate and rhythm, no murmurs, gallops or rubs  § Palpation of Heart  § : normal apical impulse, no cardiac thrill present  o Peripheral Vascular System  o :   § Extremities  § : no cyanosis, clubbing or edema; less than 2 second refill noted  · Gastrointestinal  o Abdominal Examination  o : abdomen nontender to palpation, tone normal without rigidity or guarding, no masses present, abdominal contour scaphoid  o Liver and spleen  o : no hepatomegaly present, liver nontender to palpation, spleen not palpable  · Genitourinary  o FEMALE  EXAM:  o :   §  and rectal exam deferred  § : negative CVA tenderness  · Musculoskeletal  o Right Lower Extremity  o :   § Inspection/Palpation  § : no joint or limb tenderness to palpation  § Range of Motion  § : range of motion normal, no joint crepitations present, no pain on motion  o Left Lower Extremity  o :   § Inspection/Palpation  § : no joint or limb tenderness to palpation  § Range of Motion  § : range of motion normal, no joint crepitations present, no pain on motion  · Skin and Subcutaneous Tissue  o General Inspection  o : no rashes or lesions present, no areas of discoloration  · Neurologic  o Mental Status Examination  o :   § Orientation  § : grossly oriented to person, place and time  o Cranial Nerves  o : cranial nerves intact and symmetric throughout  · Psychiatric  o Mood and Affect  o : mood normal, affect appropriate          Results  · In-Office Procedures  o Lab procedure  § IOP - Urinalysis without Microscopy (Clinitek) [facility] ([id number])   § Color Ur: Lt. Yellow   § Clarity Ur: Clear   § Glucose Ur Ql Strip: Negative   § Bilirub Ur Ql Strip: Negative   § Ketones Ur Ql Strip: Negative   § Sp Gr Ur Qn: 1.015   § Hgb Ur Ql Strip: Trace-Intact   § pH Ur-LsCnc: 7.0   § Prot Ur Ql Strip: Negative   § Urobilinogen Ur Strip-mCnc: 0.2 E.U./dL   § Nitrite Ur  Ql Strip: Negative   § WBC Est Ur Ql Strip: Small       Assessment  · Anxiety disorder     300.00/F41.9  · Depression     311/F32.9  · Essential hypertension     401.9/I10  · Hypothyroidism     244.9/E03.9  · Urinary tract infection     599.0/N39.0  · Malodorous urine     791.9/R82.90  · Urinary frequency     788.41/R35.0  · Dysuria     788.1/R30.0  · Left foot pain     729.5/M79.672      Plan  · Orders  o Urine Culture (Clean Catch) ([id number], [id number]) - 599.0/N39.0 - 04/12/2021  o ACO-39: Current medications updated and reviewed (1159F, ) - - 04/12/2021  o PODIATRY CONSULTATION (PODIA) - - 04/12/2021  · Instructions  o Increase fluid intake. If you develop fever, chills, N/V, flank pain, or worsening of your symptoms return to the office or go to the ER.  o Patient was educated/instructed on their diagnosis, treatment and medications prior to discharge from the clinic today.  o Patient instructed to seek medical attention urgently for new or worsening symptoms.  o Call the office with any concerns or questions.  · Disposition  o Call or Return if symptoms worsen or persist.            Electronically Signed by: ROSCOE March -Author on April 12, 2021 03:21:19 PM

## 2021-05-14 NOTE — PROGRESS NOTES
Progress Note      Patient Name: Geovanna Whitten   Patient ID: [id number]   Sex: Female   YOB: 1935    Primary Care Provider: Rivka MALHOTRA   Referring Provider: Rivka MALHOTRA    Visit Date: March 18, 2021    Provider: Sudheer Day DPM   Location: [facility]   Location Address: [address] [place] [address]  [id number]   Location Phone: [phone]          Chief Complaint  · Painful Toenails      History Of Present Illness  Geovanna Whitten is a new patient in our office and complains of painful, elongated toenails which are thickened, yellowed, chalky, and cause pain with shoe gear and ambulation.      New, Established, New Problem: established   Location: Toenails  Duration: Greater than five years  Onset: Gradual  Nature: sore with palpation.  Stable, worsening, improving: Worsening  Aggravating factors: Pain with shoe gear and ambulation.  Previous Treatment: debridement    Patient reports the following medical changes since their last visit:    - going to Physical Therapy for Left leg  - cellulitis in Left leg    Patient denies any fevers, chills, nausea, vomiting, shortness of breathe, nor any other constitutional signs nor symptoms.       Past Medical History  Acquired spondylolisthesis , lumbar; Arthritis; Bladder disorder; Bladder problem; Bone Density Screening; Corns and callus; Degeneration of lumbar intervertebral disc; Gastroesophageal Reflux Disorder; Gout; Hearing decreased; High cholesterol; High cholesterol; Hyperlipemia; Hypertension; Hypertension, Benign Essential; Ingrowing toenail; Leg pain; Leg swelling; Limb Swelling; Lumbago/low back pain; Migraines; Muscle cramps; Numbness in feet; Pain, Arm; Pain, Joint; Parkinson's Disease; Radiculopathy, lumbosacral; Reflux; Scoliosis; Screening mammogram, encounter for; Thyroid disease; Thyroid disorder; Vertigo         Past Surgical History  Bladder Surg.; Colonoscopy; EYE SURGERY; Glaucoma surgery;  "Hysterectomy         Medication List  amlodipine 5 mg oral tablet; aspirin 81 mg oral tablet,delayed release (DR/EC); Calcium 600 600 mg calcium (1,500 mg) oral tablet; carbidopa-levodopa  mg oral tablet; citalopram 20 mg oral tablet; iron 325 mg (65 mg iron) oral tablet; latanoprost 0.005 % ophthalmic (eye) drops; levothyroxine 75 mcg oral tablet; lisinopril 10 mg oral tablet; pramipexole 0.5 mg oral tablet; Pravachol 20 mg oral tablet; pravastatin 20 mg oral tablet; tramadol 50 mg oral tablet; Ultram 50 mg oral tablet; Vitamin D3 2,000 unit oral capsule; Women's Daily Multivitamin oral         Allergy List  iodine; SULFA (SULFONAMIDES)       Allergies Reconciled  Family Medical History  Stroke; Heart Disease; Family history of Arthritis; Family history of heart disease; Family history of diabetes mellitus         Reproductive History   5 Para 5 0 0 0       Social History  Advance Care Plan/Surrogate Decision Maker scanned into EMR; Alcohol (Never); Alcohol Use (Never); lives alone; No known infection risk; Recreational Drug Use (Never); Retired.; Tobacco (Never); ;          Immunizations  Name Date Admin   Influenza 10/01/2020   Influenza 10/01/2019         Review of Systems  · Constitutional  o Denies  o : fever, chills  · Eyes  o Denies  o : double vision  · HENT  o Denies  o : vertigo, recent head injury, hearing loss or ringing  · Cardiovascular  o Denies  o : chest pain, irregular heart beats  · Gastrointestinal  o Denies  o : nausea, vomiting  · Neurologic  o Denies  o : altered mental status, tingling or numbness, seizures  · Musculoskeletal  o Denies  o : joint pain, joint swelling, limitation of motion      Vitals  Date Time BP Position Site L\R Cuff Size HR RR TEMP (F) WT  HT  BMI kg/m2 BSA m2 O2 Sat FR L/min FiO2 HC       2021 11:04 /63 Sitting    75 - R  97.3 143lbs 0oz 5'  4\" 24.55 1.71 99 %      2021 11:04 /54 Sitting                       Physical " Examination  · Constitutional  o Appearance  o : well-nourished, well developed, no obvious deformities present, appears to be chronically ill   · Respiratory  o Respiratory Effort  o : No labored breathing. Good respiratory effort.   · Cardiovascular  o Peripheral Vascular System  o :   § Pedal Pulses  § : Pedal Pulses are 2+ and symmetrical  § Extremities  § : There is no edema of the lower extremities  · Musculoskeletal  o Extremeties/Joint  o : Lower extremity muscle strength and range of motion is equal and symmetrical bilaterally. The knees are noted to be in normal alignment. Ankle alignment and range of motion is normal and foot structure is normal.  · Neurologic  o Sensation  o : Sharp/dull sensation is within normal limits bilaterally. Monofilament sensation examination of the left foot is normal. Monofilament sensation examination of the right foot is normal.  · Toes  o Toes: Right Foot  o :   § Toenails  § : Toenails are hypertrophic, mycotc, dystrophic, thickened, with sublingual debris, incurvated, brittle toenail(s) at nail-1, 2, 3, 4, 5, Oncholysis of the foot   · Procedures  o Nail Debridement  o : Nail debridement is indicated for the following toenails:-right hallux, right 2nd toe, right 3rd toe, right 4th toe, right 5th toe, The nail was debrided of excessive thickness to appropriate levels of comfort and contour using nail nippers. The procedure was without complications          Assessment  · Foot pain, right     729.5/M79.671  · Ingrowing nail     703.0/L60.0  · Tinea unguium     110.1/B35.1  · Tremor     781.0/R25.1      Plan  · Orders  o Debridement of one to five nails (75247) - 729.5/M79.671, 703.0/L60.0, 110.1/B35.1 - 03/18/2021  o ACO-39: Current medications updated and reviewed () - 729.5/M79.671, 703.0/L60.0, 110.1/B35.1 - 03/18/2021  · Medications  o Medications have been Reconciled  o Transition of Care or Provider Policy  · Instructions  o Follow Up in 9 weeks  o I have  discussed the findings of this evaluation with the patient. The discussion included a complete verbal explanation of any changes in the examination results, diagnosis, and the current treatment plan. A schedule for future care needs was explained. If any questions should arise after returning home, I have encouraged the patient to feel free to contact Dr. Day. The patient states understanding and agreement with this plan.   o Pt to monitor for problems and to contact Dr. Day for follow-up should such signs occur. Patient states understanding and agreement with this plan.   o Onychomycosis is present in 2-3% of the population with the most common source of contamination coming from the patient's own skin. Fifteen to 20 percent of people between the ages of 40 and 60 have onychomycosis, 32 percent of 60- to 49-xera-ifit have nail fungus and approximately 50 percent of those over 70 are afflicted. Seventy-five percent of the people who have this infection exhibit psychosocial concerns. These people face the dilemma of not being able to go to the swimming pool, public shower areas or even wear open-toed sandals. More important is the fact that 48 percent of the people with onychomycosis have pain. The pain is intense enough to have these patients miss 1.8 days of work on average over a six-month period. Traditional topical therapies used alone are generally ineffective for clearing the primary infection, and even oral therapy is associated with a high rate of initial treatment failure or recurrence. In many patients combination oral and topical therapy is the treatment of choice.   o I have recommended debridement of the devitalized or contaminated tissue. Debridement will relieve the pressure of the necrotic presence of on the nail and provides for a better cosmetic appearance. Debulking the nail does help in combination with other treatments in that it can decrease the fungal load of the nail itself.    o Electronically Identified Patient Education Materials Provided Electronically  · Disposition  o Call or Return if symptoms worsen or persist.            Electronically Signed by: Sudheer Day DPM -Author on March 18, 2021 11:13:14 AM

## 2021-05-14 NOTE — PROGRESS NOTES
Progress Note      Patient Name: Geovanna Whitten   Patient ID: [id number]   Sex: Female   YOB: 1935    Primary Care Provider: Rivka MALHOTRA   Referring Provider: Rivka MALHOTRA    Visit Date: April 29, 2021    Provider: ROSCOE March   [place] [facility]   [place] Address[place] [address] [place] [address], [address]  [place]  [id number]   Location Phone: [phone]          Chief Complaint  · Annual Wellness Exam      History Of Present Illness  The patient is a 86 year old /White female who has come to this office for her Annual Wellness Visit.   Her Primary Care Provider is Rivka MALHOTRA. Her comprehensive Care Team list, including suppliers, has been updated on the Facesheet. Her medical/family history, height, weight, BMI, and blood pressure have been reviewed and are in the chart. The Health Risk Assessment has been completed and scanned in the chart.   Medications are listed in the medication list.   The active problem list includes: Arthritis, Gastroesophageal Reflux Disorder, High cholesterol, and depression, hypothyroidism   The patient does not have a history of substance use.   Patient reports her diet is adequate.   The Mini-Cog has been administered and is scanned in chart. The results are negative. Her cognitive function is limited, including forgetfulness, with the severity being mild.   A hearing loss screen was completed today and the result is negative.   Patient has the following risk factors for depression: currently has depression and currently has a mood disorder. Patient completed the PHQ-9 today and it has been scanned in the chart. The total score is 10-14.   The Timed Up and Go screen was administered today and the result is negative.   The Ignacio Index of [place] in ADLs indicated full function (score of 6).   A Falls Risk Assessment has been completed, including a review of home fall hazards and medication review.   Overall,  the patient's functional ability is noted by this provider to be within normal limits. Her level of safety is noted to be within normal limits. Her balance/gait is abnormal. There have been no falls in the past year. Patient-specific home safety recommendations have been reviewed and a copy has been given to patient.   She denies issues with leaking urine.   There are no additional risk factors identified.   Living Will/Advanced Directive previously executed and scanned in chart.   Personalized health advice was given to the patient and a written health screening schedule was established; see Plan for details.       Past Medical History  Disease Name Date Onset Notes   Acquired spondylolisthesis , lumbar 01/23/2020 --    Arthritis --  --    Bladder disorder --  --    Bladder problem --  --    Bone Density Screening 2/28/2018 [facility]   Corns and callus --  --    Degeneration of lumbar intervertebral disc 01/23/2020 --    Gastroesophageal Reflux Disorder --  --    Gout --  --    Hearing decreased --  --    High cholesterol --  --    High cholesterol --  --    Hyperlipemia --  --    Hypertension --  --    Hypertension, Benign Essential --  --    Ingrowing toenail --  --    Leg pain --  --    Leg swelling --  --    Limb Swelling --  --    Lumbago/low back pain 01/23/2020 --    Migraines --  --    Muscle cramps --  --    Numbness in feet --  --    Pain, Arm --  --    Pain, Joint --  --    Parkinson's Disease --  --    Radiculopathy, lumbosacral 01/23/2020 --    Reflux --  --    Scoliosis 01/23/2020 --    Screening mammogram, encounter for 7/2/2020 normal    Thyroid disease --  --    Thyroid disorder --  --    Vertigo --  --          Past Surgical History  Procedure Name Date Notes   Bladder Surg. --  --    Colonoscopy --  --    EYE SURGERY --  --    Glaucoma surgery --  --    Hysterectomy --  --          Medication List  Name Date Started Instructions   amlodipine 5 mg oral tablet 04/16/2021 TAKE ONE TABLET BY MOUTH  DAILY   aspirin 81 mg oral tablet,delayed release (DR/EC)  take 1 tablet (81 mg) by oral route once daily   bathroom chair with back 04/29/2021 use for assistance in bathroom   Calcium 600 600 mg calcium (1,500 mg) oral tablet  take 1 tablet by oral route daily   carbidopa-levodopa  mg oral tablet 06/18/2020 TAKE 2.5 TABLETS BY MOUTH FIVE TIMES A DAY for 90 days   citalopram 20 mg oral tablet 03/08/2021 TAKE ONE TABLET BY MOUTH EVERY MORNING   iron 325 mg (65 mg iron) oral tablet  take 1 tablet (325 mg) by oral route once daily   latanoprost 0.005 % ophthalmic (eye) drops  instill 1 drop into affected eye(s) by ophthalmic route once daily in the evening   levothyroxine 75 mcg oral tablet 01/18/2021 TAKE ONE TABLET BY MOUTH DAILY   lightweight walker 04/29/2021 walk assistance   lisinopril 10 mg oral tablet 04/23/2020 take 1 tablet (10 mg) by oral route once daily for 90 days   pramipexole 0.5 mg oral tablet 06/18/2020 take 1 tablet by oral route 2 times a day for 90 days   Pravachol 20 mg oral tablet  take 1/2 tablet by oral route QD   pravastatin 20 mg oral tablet  take 1 tablet (20 mg) by oral route once daily   tramadol 50 mg oral tablet  take 1 tablet by oral route daily   Ultram 50 mg oral tablet  take 1-2 tablets by oral route every 4 to 6 hours   Vitamin D3 2,000 unit oral capsule  take 1 capsule by oral route daily   Women's Daily Multivitamin oral  --          Allergy List  Allergen Name Date Reaction Notes   iodine --  --  --    SULFA (SULFONAMIDES) --  --  --          Family Medical History  Disease Name Relative/Age Notes   Stroke Mother/   Mother   Heart Disease Father/   Father   Family history of Arthritis Mother/   Mother   Family history of heart disease Father/   Father   Family history of diabetes mellitus Grandmother (maternal)/   --          Reproductive History  Menstrual   Age Menarche: 14   Pregnancy Summary   Total Pregnancies: 5 Full Term: 5 Premature: 0   Ab Induced: 0 Ab  "Spontaneous: 0 Ectopics: 0   Multiples: 0 Livin         Social History  Finding Status Start/Stop Quantity Notes   Advance Care Plan/Surrogate Decision Maker scanned into EMR --  --/-- --  --    Alcohol Never --/-- --  2020 - 2020 - does not drink   Alcohol Use Never --/-- --  does not drink   lives alone --  --/-- --  --    No known infection risk --  --/-- --  --    Recreational Drug Use Never --/-- --  no   Retired. --  --/-- --  --    Tobacco Never --/-- --  never smoker  2020 - 2020 - never smoker    --  --/-- --  --     --  --/-- --  --          Immunizations  NameDate Admin Mfg Trade Name Lot Number Route Inj VIS Given VIS Publication   [id number] SK[employer] Fluzone Quadriv[employer]  NE NE     Comments:          Vitals  Date Time BP Position Site L\R Cuff Size HR RR TEMP (F) WT  HT  BMI kg/m2 BSA m2 O2 Sat FR L/min FiO2 HC       2021 10:33 /53 Sitting    72 - R   143lbs 0oz 5'  4\" 24.55 1.71 100 %  21%          Physical Examination  · Ears, Nose, Mouth and Throat  o Ears  o :   § External Ears  § : appearance within normal limits, no lesions present  § Otoscopic Examination  § : tympanic membrane appearance within normal limits bilaterally without perforations, mobility normal  o Nose  o :   § External Nose  § : normal stucture noted.  § Intranasal Exam  § : no swelling, reddness, turbs normal shaina.  o Oral Cavity  o :   § Oral Mucosa  § : oral mucosa normal without pallor or cyanosis  § Lips  § : lip appearance normal  § Teeth  § : normal dentition for age  § Gums  § : gums pink, non-swollen, no bleeding present  § Tongue  § : tongue appearance normal  § Palate  § : hard palate normal, soft palate appearance normal  o Throat  o :   § Oropharynx  § : no inflammation or lesions present, tonsils within normal limits  · Respiratory  o Respiratory Effort  o : breathing unlabored  o Auscultation of Lungs  o : normal breath sounds " throughout  · Cardiovascular  o Heart  o :   § Auscultation of Heart  § : regular rate and rhythm, no murmurs, gallops or rubs  § Palpation of Heart  § : normal apical impulse, no cardiac thrill present  o Peripheral Vascular System  o :   § Carotid Arteries  § : normal pulses bilaterally, no bruits present  § Pedal Pulses  § : pulses 2 bilaterally  § Extremities  § : no cyanosis, clubbing or edema; less than 2 second refill noted  · Gastrointestinal  o Abdominal Examination  o : abdomen nontender to palpation, tone normal without rigidity or guarding, no masses present, abdominal contour scaphoid  o Liver and spleen  o : no hepatomegaly present, liver nontender to palpation, spleen not palpable  · Musculoskeletal  o Right Upper Extremity  o :   § Inspection/Palpation  § : no tenderness to palpation  § Range of Motion  § : range of motion normal, no joint crepitus or pain with motion present  o Left Upper Extremity  o :   § Inspection/Palpation  § : no tenderness to palpation  § Range of Motion  § : range of motion normal, no joint crepitus present, no pain with joint motion  o Right Lower Extremity  o :   § Inspection/Palpation  § : no joint or limb tenderness to palpation  § Range of Motion  § : range of motion normal, no joint crepitations present, no pain on motion  o Left Lower Extremity  o :   § Inspection/Palpation  § : no joint or limb tenderness to palpation  § Range of Motion  § : range of motion normal, no joint crepitations present, no pain on motion  · Skin and Subcutaneous Tissue  o General Inspection  o : no rashes or lesions present, no areas of discoloration  · Neurologic  o Mental Status Examination  o :   § Orientation  § : grossly oriented to person, place and time  o Cranial Nerves  o : cranial nerves intact and symmetric throughout  · Psychiatric  o Mood and Affect  o : mood normal, affect appropriate, denies any SI/HI          Assessment  · Encounter for Medicare annual wellness  exam     V70.0/Z00.00  · Screening for depression     V79.0/Z13.89  · Screening for alcoholism     V79.1/Z13.39      Plan  · Orders  o Falls Risk Assessment Completed (3288F) - V70.0/Z00.00 - 04/29/2021  o Brief hearing screening (written) [facility] () - V70.0/Z00.00 - 04/29/2021  o Annual Wellness Visit-includes a Personalized Prevention Plan of Service (PPS), SUBSEQUENT VISIT (Medicare) () - V70.0/Z00.00 - 04/29/2021  o ACO-13: Fall Risk Screening with no falls in past year or only one fall without injury in the past year (1101F) - V70.0/Z00.00 - 04/29/2021  o Presence or absence of urinary incontinence assessed (CHRISTY) (1090F) - V70.0/Z00.00 - 04/29/2021  o ACO-18: Positive screen for clinical depression using a standardized tool and a follow-up plan documented () - V79.0/Z13.89 - 04/29/2021  o Negative alcohol screening () - V79.1/Z13.39 - 04/29/2021  o ACO-39: Current medications updated and reviewed (, 1159F) - - 04/29/2021  · Medications  o Medications have been Reconciled  o Transition of Care or Provider Policy  · Instructions  o Health Risk Assessment has been reviewed with the patient.  o Written health screening schedule for next 5-10 years was established with patient; information scanned in chart and given/mailed to patient.  o Fall prevention methods discussed and a copy of recommendations given/mailed to patient.  o Depression Screen completed and scanned into the EMR under the designated folder within the patient's documents.  o Today's PHQ-9 result is _10. Pt is currently being counselled __  o Audit-C score of 0-4 - Negative Screen - Brief Discussion            Electronically Signed by: ROSCOE March -Author on April 29, 2021 12:54:32 PM

## 2021-05-14 NOTE — PROGRESS NOTES
Progress Note      Patient Name: Geovanna Whitten   Patient ID: [id number]   Sex: Female   YOB: 1935    Primary Care Provider: Rivka MALHOTRA   Referring Provider: Rivka MALHOTRA    Visit Date: April 29, 2021    Provider: ROSCOE March   Location: [facility]   Location Address: [address] [place] [address], [address]  [place], [place]  [id number]   Location Phone: [phone]          Chief Complaint  · 6 mo f/u  · UTI symptoms      History Of Present Illness  Geovanna Whitten is a 86 year old /White female who presents for evaluation and treatment of:      Pt is a 6 mo f/u. Pt will have MAW visit today also. Pt has c/o UTI. Pt was seen 4-12-21 for UTI. Pt was treated with Macrobid. Pt feels she still has a UTI having frequency.    Pt states she is no longer able to cook. Pt has left the stove on several times. Pt will also not be driving much longer. She is getting meals from [facility] and her family can bring to her so she does not have to cook. We did discuss [employer] grocery delivery.    Pt would like a RX for a lightweight walker and a chair for her bathroom with a back.       Past Medical History  Disease Name Date Onset Notes   Acquired spondylolisthesis , lumbar 01/23/2020 --    Arthritis --  --    Bladder disorder --  --    Bladder problem --  --    Bone Density Screening 2/28/2018 [facility] [place]   Corns and callus --  --    Degeneration of lumbar intervertebral disc 01/23/2020 --    Gastroesophageal Reflux Disorder --  --    Gout --  --    Hearing decreased --  --    High cholesterol --  --    High cholesterol --  --    Hyperlipemia --  --    Hypertension --  --    Hypertension, Benign Essential --  --    Ingrowing toenail --  --    Leg pain --  --    Leg swelling --  --    Limb Swelling --  --    Lumbago/low back pain 01/23/2020 --    Migraines --  --    Muscle cramps --  --    Numbness in feet --  --    Pain, Arm --  --    Pain, Joint --  --    Parkinson's Disease  --  --    Radiculopathy, lumbosacral 01/23/2020 --    Reflux --  --    Scoliosis 01/23/2020 --    Screening mammogram, encounter for 7/2/2020 normal    Thyroid disease --  --    Thyroid disorder --  --    Vertigo --  --          Past Surgical History  Procedure Name Date Notes   Bladder Surg. --  --    Colonoscopy --  --    EYE SURGERY --  --    Glaucoma surgery --  --    Hysterectomy --  --          Medication List  Name Date Started Instructions   amlodipine 5 mg oral tablet 04/16/2021 TAKE ONE TABLET BY MOUTH DAILY   aspirin 81 mg oral tablet,delayed release (DR/EC)  take 1 tablet (81 mg) by oral route once daily   Calcium 600 600 mg calcium (1,500 mg) oral tablet  take 1 tablet by oral route daily   carbidopa-levodopa  mg oral tablet 06/18/2020 TAKE 2.5 TABLETS BY MOUTH FIVE TIMES A DAY for 90 days   citalopram 20 mg oral tablet 03/08/2021 TAKE ONE TABLET BY MOUTH EVERY MORNING   iron 325 mg (65 mg iron) oral tablet  take 1 tablet (325 mg) by oral route once daily   latanoprost 0.005 % ophthalmic (eye) drops  instill 1 drop into affected eye(s) by ophthalmic route once daily in the evening   levothyroxine 75 mcg oral tablet 01/18/2021 TAKE ONE TABLET BY MOUTH DAILY   lisinopril 10 mg oral tablet 04/23/2020 take 1 tablet (10 mg) by oral route once daily for 90 days   Macrobid 100 mg oral capsule 04/15/2021 take 1 capsule (100 mg) by oral route every 12 hours with food for 7 days   pramipexole 0.5 mg oral tablet 06/18/2020 take 1 tablet by oral route 2 times a day for 90 days   Pravachol 20 mg oral tablet  take 1/2 tablet by oral route QD   pravastatin 20 mg oral tablet  take 1 tablet (20 mg) by oral route once daily   tramadol 50 mg oral tablet  take 1 tablet by oral route daily   Ultram 50 mg oral tablet  take 1-2 tablets by oral route every 4 to 6 hours   Vitamin D3 2,000 unit oral capsule  take 1 capsule by oral route daily   Women's Daily Multivitamin oral  --          Allergy List  Allergen Name Date  Reaction Notes   iodine --  --  --    SULFA (SULFONAMIDES) --  --  --        Allergies Reconciled  Family Medical History  Disease Name Relative/Age Notes   Stroke Mother/   Mother   Heart Disease Father/   Father   Family history of Arthritis Mother/   Mother   Family history of heart disease Father/   Father   Family history of diabetes mellitus Grandmother (maternal)/   --          Reproductive History  Menstrual   Age Menarche: 14   Pregnancy Summary   Total Pregnancies: 5 Full Term: 5 Premature: 0   Ab Induced: 0 Ab Spontaneous: 0 Ectopics: 0   Multiples: 0 Livin         Social History  Finding Status Start/Stop Quantity Notes   Advance Care Plan/Surrogate Decision Maker scanned into EMR --  --/-- --  --    Alcohol Never --/-- --  2020 - 2020 - does not drink   Alcohol Use Never --/-- --  does not drink   lives alone --  --/-- --  --    No known infection risk --  --/-- --  --    Recreational Drug Use Never --/-- --  no   Retired. --  --/-- --  --    Tobacco Never --/-- --  never smoker  2020 - 2020 - never smoker    --  --/-- --  --     --  --/-- --  --          Immunizations  NameDate Admin Mfg Trade Name Lot Number Route Inj VIS Given VIS Publication   [id number] SKB Fluzone [employer]riv[employer]  NE NE     Comments:          Review of Systems  · Constitutional  o Denies  o : fever, fatigue, weight loss, weight gain  · Cardiovascular  o Denies  o : lower extremity edema, claudication, chest pressure, palpitations  · Respiratory  o Denies  o : shortness of breath, wheezing, cough, hemoptysis, dyspnea on exertion  · Gastrointestinal  o Denies  o : nausea, vomiting, diarrhea, constipation, abdominal pain  · Genitourinary  o Admits  o : frequency, dysuria  · Psychiatric  o Admits  o : anxiety, depression      Vitals  Date Time BP Position Site L\R Cuff Size HR RR TEMP (F) WT  HT  BMI kg/m2 BSA m2 O2 Sat FR L/min FiO2        2021 10:33 /53 Sitting    72 - R    "143lbs 0oz 5'  4\" 24.55 1.71 100 %  21%          Physical Examination  · Constitutional  o Appearance  o : well-nourished, well developed, alert, in no acute distress, uses a cane to ambulate  · Ears, Nose, Mouth and Throat  o Ears  o :   § External Ears  § : appearance within normal limits, no lesions present  § Otoscopic Examination  § : tympanic membrane appearance within normal limits bilaterally without perforations, mobility normal  o Nose  o :   § External Nose  § : normal stucture noted.  § Intranasal Exam  § : no swelling, reddness, turbs normal shaina.  o Oral Cavity  o :   § Oral Mucosa  § : oral mucosa normal without pallor or cyanosis  § Lips  § : lip appearance normal  § Teeth  § : normal dentition for age  § Gums  § : gums pink, non-swollen, no bleeding present  § Tongue  § : tongue appearance normal  § Palate  § : hard palate normal, soft palate appearance normal  o Throat  o :   § Oropharynx  § : no inflammation or lesions present, tonsils within normal limits  · Respiratory  o Respiratory Effort  o : breathing unlabored  o Auscultation of Lungs  o : normal breath sounds throughout  · Cardiovascular  o Heart  o :   § Auscultation of Heart  § : regular rate and rhythm, no murmurs, gallops or rubs  § Palpation of Heart  § : normal apical impulse, no cardiac thrill present  o Peripheral Vascular System  o :   § Extremities  § : no cyanosis, clubbing or edema; less than 2 second refill noted  · Genitourinary  o FEMALE  EXAM:  o :   §  and rectal exam deferred  § : negative CVA tenderness  · Skin and Subcutaneous Tissue  o General Inspection  o : no rashes or lesions present, no areas of discoloration  · Neurologic  o Mental Status Examination  o :   § Orientation  § : grossly oriented to person, place and time  o Cranial Nerves  o : cranial nerves intact and symmetric throughout  · Psychiatric  o Mood and Affect  o : mood normal, affect appropriate, denies any SI/HI          Results  · In-Office " "Procedures  o Lab procedure  § IOP - Urinalysis without Microscopy (Clinitek) [facility] ([id number])   § Color Ur: Lt. Yellow   § Clarity Ur: Clear   § Glucose Ur Ql Strip: Negative   § Bilirub Ur Ql Strip: Negative   § Ketones Ur Ql Strip: Negative   § Sp Gr Ur Qn: 1.015   § Hgb Ur Ql Strip: Negative   § pH Ur-LsCnc: 7.0   § Prot Ur Ql Strip: Negative   § Urobilinogen Ur Strip-mCnc: 0.2 E.U./dL   § Nitrite Ur Ql Strip: Negative   § WBC Est Ur Ql Strip: Trace       Assessment  · Anxiety disorder     300.00/F41.9  · Depression     311/F32.9  · Essential hypertension     401.9/I10  · Hyperlipidemia     272.4/E78.5  · Hypothyroidism     244.9/E03.9  · Scoliosis     737.30/M41.9  · Hearing decreased     389.9/H91.90  · Urinary frequency     788.41/R35.0  · Dysuria     788.1/R30.0      Plan  · Orders  o ACO-39: Current medications updated and reviewed (, 1159F) - - 04/29/2021  o Urine Culture (Clean Catch) [facility] ([id number]) - - 04/29/2021  · Medications  o Macrobid 100 mg oral capsule   SIG: take 1 capsule (100 mg) by oral route every 12 hours with food for 7 days   DISP: (14) Capsule with 0 refills  Discontinued on 04/29/2021     o Medications have been Reconciled  o Transition of Care or Provider Policy  · Instructions  o Patient was given an SSRI/SSNRI medication and warned of possible side effects of the medication including potential for increased risk of suicidal thoughts and feelings. Patient was instructed that if they begin to exhibit any of these effects they will discontinue the medication immediately and contact our office or the ER ASAP.  o Patient agrees to a \"No Self Harm\" contract. Patient will either call us, [facility], ER, [facility], [facility].  o Patient was given an SSRI/SSNRI medication and warned of possible side effects of the medication including potential for increased risk of suicidal thoughts and feelings. Patient was instructed that if they begin to exhibit any of these effects " "they will discontinue the medication immediately and contact our office or the ER ASAP.  o Patient agrees to a \"No Self Harm\" contract. Patient will either call us, 1, ER, [facility], [facility].  o Advised that cheeses and other sources of dairy fats, animal fats, fast food, and the extras (candy, pastries, pies, doughnuts and cookies) all contain LDL raising nutrients. Advised to increase fruits, vegetables, whole grains, and to monitor portion sizes.   o Patient was educated/instructed on their diagnosis, treatment and medications prior to discharge from the clinic today.  o Patient instructed to seek medical attention urgently for new or worsening symptoms.  o Call the office with any concerns or questions.  · Disposition  o Call or Return if symptoms worsen or persist.  o Return Visit Request in/on 6 months +/- 2 weeks ([id number]).            Electronically Signed by: ROSCOE March -Author on April 29, 2021 10:55:35 AM  "

## 2021-05-15 VITALS
BODY MASS INDEX: 24.45 KG/M2 | DIASTOLIC BLOOD PRESSURE: 48 MMHG | SYSTOLIC BLOOD PRESSURE: 171 MMHG | HEIGHT: 63 IN | WEIGHT: 138 LBS

## 2021-05-15 VITALS
HEART RATE: 87 BPM | HEIGHT: 64 IN | SYSTOLIC BLOOD PRESSURE: 158 MMHG | BODY MASS INDEX: 23.13 KG/M2 | DIASTOLIC BLOOD PRESSURE: 46 MMHG | WEIGHT: 135.5 LBS

## 2021-05-15 VITALS
SYSTOLIC BLOOD PRESSURE: 132 MMHG | WEIGHT: 140 LBS | HEART RATE: 78 BPM | OXYGEN SATURATION: 98 % | HEIGHT: 64 IN | BODY MASS INDEX: 23.9 KG/M2 | DIASTOLIC BLOOD PRESSURE: 62 MMHG

## 2021-05-15 VITALS
HEART RATE: 81 BPM | BODY MASS INDEX: 22.94 KG/M2 | OXYGEN SATURATION: 95 % | SYSTOLIC BLOOD PRESSURE: 172 MMHG | DIASTOLIC BLOOD PRESSURE: 75 MMHG | WEIGHT: 134.37 LBS | HEIGHT: 64 IN

## 2021-05-15 VITALS
OXYGEN SATURATION: 95 % | BODY MASS INDEX: 23.41 KG/M2 | WEIGHT: 137.12 LBS | TEMPERATURE: 98 F | DIASTOLIC BLOOD PRESSURE: 65 MMHG | HEART RATE: 80 BPM | SYSTOLIC BLOOD PRESSURE: 139 MMHG | HEIGHT: 64 IN

## 2021-05-15 VITALS
HEIGHT: 64 IN | DIASTOLIC BLOOD PRESSURE: 58 MMHG | SYSTOLIC BLOOD PRESSURE: 152 MMHG | BODY MASS INDEX: 23.31 KG/M2 | WEIGHT: 136.56 LBS

## 2021-05-15 VITALS
HEART RATE: 71 BPM | WEIGHT: 137.12 LBS | OXYGEN SATURATION: 99 % | BODY MASS INDEX: 23.41 KG/M2 | DIASTOLIC BLOOD PRESSURE: 69 MMHG | SYSTOLIC BLOOD PRESSURE: 152 MMHG | HEIGHT: 64 IN

## 2021-05-15 VITALS
BODY MASS INDEX: 24.27 KG/M2 | HEIGHT: 63 IN | SYSTOLIC BLOOD PRESSURE: 145 MMHG | HEART RATE: 78 BPM | WEIGHT: 137 LBS | DIASTOLIC BLOOD PRESSURE: 52 MMHG

## 2021-05-15 VITALS
WEIGHT: 134.25 LBS | OXYGEN SATURATION: 96 % | HEIGHT: 64 IN | HEART RATE: 84 BPM | SYSTOLIC BLOOD PRESSURE: 143 MMHG | DIASTOLIC BLOOD PRESSURE: 74 MMHG | BODY MASS INDEX: 22.92 KG/M2

## 2021-05-15 VITALS
DIASTOLIC BLOOD PRESSURE: 67 MMHG | OXYGEN SATURATION: 95 % | WEIGHT: 136 LBS | SYSTOLIC BLOOD PRESSURE: 136 MMHG | HEART RATE: 93 BPM | HEIGHT: 64 IN | BODY MASS INDEX: 23.22 KG/M2

## 2021-05-15 VITALS
SYSTOLIC BLOOD PRESSURE: 167 MMHG | BODY MASS INDEX: 24.24 KG/M2 | HEART RATE: 68 BPM | HEIGHT: 64 IN | DIASTOLIC BLOOD PRESSURE: 51 MMHG | WEIGHT: 142 LBS

## 2021-05-15 VITALS — TEMPERATURE: 96.9 F

## 2021-05-16 VITALS
HEART RATE: 78 BPM | WEIGHT: 145.06 LBS | HEIGHT: 63 IN | SYSTOLIC BLOOD PRESSURE: 143 MMHG | DIASTOLIC BLOOD PRESSURE: 52 MMHG | BODY MASS INDEX: 25.7 KG/M2

## 2021-05-25 ENCOUNTER — OFFICE VISIT CONVERTED (OUTPATIENT)
Dept: CARDIOLOGY | Facility: CLINIC | Age: 86
End: 2021-05-25
Attending: INTERNAL MEDICINE

## 2021-06-01 ENCOUNTER — OFFICE VISIT CONVERTED (OUTPATIENT)
Dept: CARDIOLOGY | Facility: CLINIC | Age: 86
End: 2021-06-01
Attending: INTERNAL MEDICINE

## 2021-06-03 ENCOUNTER — OFFICE VISIT CONVERTED (OUTPATIENT)
Dept: FAMILY MEDICINE CLINIC | Facility: CLINIC | Age: 86
End: 2021-06-03
Attending: NURSE PRACTITIONER

## 2021-06-05 NOTE — PROGRESS NOTES
Progress Note      Patient Name: Geovanna Whitten   Patient ID: [id number]   Sex: Female   YOB: 1935    Primary Care Provider: Rivka MALHOTRA   Referring Provider: Rivka MALHOTRA    Visit Date: Suma 3, 2021    Provider: ROSCOE March   Location: [facility]   Location Address: [address] [place] [address], [address]  [place], [place]  [id number]   Location Phone: [phone]          Chief Complaint  · Follow up office visit within 14 calendar days of discharge from inpatient status (moderate complexity).      History Of Present Illness  FOLLOW UP OFFICE VISIT WITHIN 14 CALENDAR DAYS OF INPATIENT STATUS (MODERATE COMPLEXITY)  Geovanna Whitten presents to office for follow up post discharge from inpatient status within 14 calendar days. Patient was contacted within 2 business days via phone conversation. Documentation of that phone contact is present in the patient's electronic chart. Patient was admitted to inpatient facility on 05/21/2021 and discharged 05/24/2021 due to: slurred speech to r/o TIA.   Admitting MD: Geronimo Ramirez MD   Her discharge summary has been reviewed and placed in the patient's electronic chart.   Patient's problem list is: Acquired spondylolisthesis , lumbar, Arthritis, Corns and callus, Degeneration of lumbar intervertebral disc, Gastroesophageal Reflux Disorder, Gout, Hearing decreased, High cholesterol, Ingrowing toenail, Lumbago/low back pain, Pain, Arm, Pain, Joint, Radiculopathy, lumbosacral, Scoliosis, and Vertigo   Patient's outpatient medication list has been reconcilled with the medication list from the discharge summary and has been reviewed with the patient. Current medication list is: amlodipine 5 mg oral tablet, aspirin 81 mg oral tablet,delayed release (DR/EC), bathroom chair with back, Calcium 600 600 mg calcium (1,500 mg) oral tablet, carbidopa-levodopa  mg oral tablet, citalopram 20 mg oral tablet, Eliquis 5 mg oral tablet,  latanoprost 0.005 % ophthalmic (eye) drops, levothyroxine 75 mcg oral tablet, lightweight walker, Lipitor 40 mg oral tablet, lisinopril 10 mg oral tablet, pramipexole 0.5 mg oral tablet, Pravachol 20 mg oral tablet, Vitamin D3 2,000 unit oral capsule, and Women's Daily Multivitamin oral   Geovanna Whitten is a 86 year old /White female who presents for evaluation and treatment of:      Patient is present with her granddaughter. Pt reports that she is very off balance and is being helped by home health/[facility] with OT/PT/speech therapies.    She requests a script for a relater walker, the one she has does not have wheels.    She reports she is moving to an assisted living in August 2021 to an assisted living in [place] and is going to establish with a PCP there.    She went to Cardiology yesterday and was hooked up to a 48 hour monitor.       Past Medical History  Disease Name Date Onset Notes   Acquired spondylolisthesis , lumbar 01/23/2020 --    Arthritis --  --    Bladder disorder --  --    Bladder problem --  --    Bone Density Screening 2/28/2018 [facility] [place]   Corns and callus --  --    Degeneration of lumbar intervertebral disc 01/23/2020 --    Gastroesophageal Reflux Disorder --  --    Gout --  --    Hearing decreased --  --    High cholesterol --  --    High cholesterol --  --    Hyperlipemia --  --    Hypertension --  --    Hypertension, Benign Essential --  --    Ingrowing toenail --  --    Leg pain --  --    Leg swelling --  --    Limb Swelling --  --    Lumbago/low back pain 01/23/2020 --    Migraines --  --    Muscle cramps --  --    Numbness in feet --  --    Pain, Arm --  --    Pain, Joint --  --    Parkinson's Disease --  --    Radiculopathy, lumbosacral 01/23/2020 --    Reflux --  --    Scoliosis 01/23/2020 --    Screening mammogram, encounter for 7/2/2020 normal    Thyroid disease --  --    Thyroid disorder --  --    Vertigo --  --          Past Surgical History  Procedure Name  Date Notes   Bladder Surg. --  --    Colonoscopy --  --    EYE SURGERY --  --    Glaucoma surgery --  --    Hysterectomy --  --          Medication List  Name Date Started Instructions   amlodipine 5 mg oral tablet 04/16/2021 TAKE ONE TABLET BY MOUTH DAILY   aspirin 81 mg oral tablet,delayed release (DR/EC)  take 1 tablet (81 mg) by oral route once daily   bathroom chair with back 04/29/2021 use for assistance in bathroom   Calcium 600 600 mg calcium (1,500 mg) oral tablet  take 1 tablet by oral route daily   carbidopa-levodopa  mg oral tablet 06/18/2020 TAKE 2.5 TABLETS BY MOUTH FIVE TIMES A DAY for 90 days   citalopram 20 mg oral tablet 03/08/2021 TAKE ONE TABLET BY MOUTH EVERY MORNING   Eliquis 5 mg oral tablet  take 1 tablet (5 mg) by oral route 2 times per day   latanoprost 0.005 % ophthalmic (eye) drops  instill 1 drop into affected eye(s) by ophthalmic route once daily in the evening   levothyroxine 75 mcg oral tablet 01/18/2021 TAKE ONE TABLET BY MOUTH DAILY   lightweight walker 04/29/2021 walk assistance   Lipitor 40 mg oral tablet  take 1 tablet (40 mg) by oral route once daily   lisinopril 10 mg oral tablet 04/23/2020 take 1 tablet (10 mg) by oral route once daily for 90 days   pramipexole 0.5 mg oral tablet 06/18/2020 take 1 tablet by oral route 2 times a day for 90 days   Pravachol 20 mg oral tablet  take 1/2 tablet by oral route QD   Vitamin D3 2,000 unit oral capsule  take 1 capsule by oral route daily   Women's Daily Multivitamin oral  --          Allergy List  Allergen Name Date Reaction Notes   iodine --  --  --    SULFA (SULFONAMIDES) --  --  --          Family Medical History  Disease Name Relative/Age Notes   Stroke Mother/   Mother   Heart Disease Father/   Father   Family history of Arthritis Mother/   Mother   Family history of heart disease Father/   Father   Family history of diabetes mellitus Grandmother (maternal)/   --          Reproductive History  Menstrual   Age Menarche: 14  "  Pregnancy Summary   Total Pregnancies: 5 Full Term: 5 Premature: 0   Ab Induced: 0 Ab Spontaneous: 0 Ectopics: 0   Multiples: 0 Livin         Social History  Finding Status Start/Stop Quantity Notes   Advance Care Plan/Surrogate Decision Maker scanned into EMR --  --/-- --  --    Alcohol Never --/-- --  2020 - 2020 - does not drink   Alcohol Use Never --/-- --  does not drink   lives alone --  --/-- --  --    No known infection risk --  --/-- --  --    Recreational Drug Use Never --/-- --  no   Retired. --  --/-- --  --    Tobacco Never --/-- --  never smoker  2020 - 2020 - never smoker    --  --/-- --  --     --  --/-- --  --          Immunizations  NameDate Admin Mfg Trade Name Lot Number Route Inj VIS Given VIS Publication   [id number] SKB Fluzone Quadrivalent  NE NE     Comments:          Review of Systems  · Constitutional  o Denies  o : fever, weight gain, weight loss, fatigue  · Cardiovascular  o Denies  o : palpitation, chest pain, claudication, lower extremity edema  · Respiratory  o Denies  o : shortness of breath, hemoptysis, wheezing, dry cough, productive cough  · Gastrointestinal  o Denies  o : nausea, vomiting, diarrhea, constipation, abdominal pain  · Neurologic  o Denies  o : unsteady gait, weakness, dizziness, H/A      Vitals  Date Time BP Position Site L\R Cuff Size HR RR TEMP (F) WT  HT  BMI kg/m2 BSA m2 O2 Sat FR L/min FiO2 HC       2021 11:22 /60 Sitting    70 - R   138lbs 0oz 5'  4\" 23.69 1.68 96 %            Physical Examination  · Constitutional  o Appearance  o : well-nourished, well developed, alert, in no acute distress  · Ears, Nose, Mouth and Throat  o Ears  o :   § External Ears  § : appearance within normal limits, no lesions present  § Otoscopic Examination  § : tympanic membrane appearance within normal limits bilaterally without perforations, mobility normal  o Nose  o :   § External Nose  § : normal stucture " noted.  § Intranasal Exam  § : no swelling, reddness, turbs normal shaina.  o Oral Cavity  o :   § Oral Mucosa  § : oral mucosa normal without pallor or cyanosis  § Lips  § : lip appearance normal  § Teeth  § : normal dentition for age  § Gums  § : gums pink, non-swollen, no bleeding present  § Tongue  § : tongue appearance normal  § Palate  § : hard palate normal, soft palate appearance normal  o Throat  o :   § Oropharynx  § : no inflammation or lesions present, tonsils within normal limits  · Respiratory  o Respiratory Effort  o : breathing unlabored  o Auscultation of Lungs  o : normal breath sounds throughout  · Cardiovascular  o Heart  o :   § Auscultation of Heart  § : regular rate and rhythm, no murmurs, gallops or rubs  § Palpation of Heart  § : normal apical impulse, no cardiac thrill present  o Peripheral Vascular System  o :   § Carotid Arteries  § : normal pulses bilaterally, no bruits present  § Pedal Pulses  § : pulses 2 bilaterally  § Extremities  § : no cyanosis, clubbing or edema; less than 2 second refill noted  · Gastrointestinal  o Abdominal Examination  o : abdomen nontender to palpation, tone normal without rigidity or guarding, no masses present, abdominal contour scaphoid  o Liver and spleen  o : no hepatomegaly present, liver nontender to palpation, spleen not palpable  · Musculoskeletal  o Right Upper Extremity  o :   § Inspection/Palpation  § : no tenderness to palpation  § Range of Motion  § : range of motion normal, no joint crepitus or pain with motion present  o Left Upper Extremity  o :   § Inspection/Palpation  § : no tenderness to palpation  § Range of Motion  § : range of motion normal, no joint crepitus present, no pain with joint motion  o Right Lower Extremity  o :   § Inspection/Palpation  § : no joint or limb tenderness to palpation  § Range of Motion  § : range of motion normal, no joint crepitations present, no pain on motion  o Left Lower Extremity  o :    § Inspection/Palpation  § : no joint or limb tenderness to palpation  § Range of Motion  § : range of motion normal, no joint crepitations present, no pain on motion  · Skin and Subcutaneous Tissue  o General Inspection  o : no rashes or lesions present, no areas of discoloration  · Neurologic  o Mental Status Examination  o :   § Orientation  § : grossly oriented to person, place and time  o Cranial Nerves  o : cranial nerves intact and symmetric throughout  · Psychiatric  o Mood and Affect  o : mood normal, affect appropriate, denies any SI/HI          Assessment  · Slurred speech     784.59/R47.81    Problems Reconciled  Plan  · Orders  o Discharge medications reconciled with the current medication list (1111F) - - 06/03/2021  o ACO-39: Current medications updated and reviewed (, 1159F) - - 06/03/2021  · Medications  o Medications have been Reconciled  o Transition of Care or Provider Policy  · Instructions  o Patient discharge summary has been reviewed and placed in patient's electronic medical record.  o Patient received a phone call from my office within 2 business days of discharge from inpatient status, and documented within the patient's chart.  o Also patient was seen (face to face) for follow up evaluation within 14 calendar days of discharge from inpatient status for a severe complexity issue.  o Patient was educated on their diagnosis, treatment and any medication changes while being evaluated today.  o Patient was educated/instructed on their diagnosis, treatment and medications prior to discharge from the clinic today.            Electronically Signed by: ROSCOE March -Author on Suma 3, 2021 11:43:54 AM

## 2021-06-05 NOTE — PROCEDURES
Procedure Note      Patient Name: Geovanna Whitten   Patient ID: [id number]   Sex: Female   YOB: 1935    Primary Care Provider: Rivka MALHOTRA   Referring Provider: Rivka MALHOTRA    Visit Date: June 1, 2021    Provider: Laith Boyer MD   Location: [facility]   Location Address: [address] [place] [address], [address]   PARMJIT Sommer  [id number]   Location Phone: [phone]          FINAL REPORT   HOLTER MONITOR REPORT  Date: 06/01/2021   Indication: Paroxysmal atrial fibrillation      Interpretation Date:  06/04/2021    48-HOUR HOLTER MONITOR    FINDINGS:   Ms. Whitten was monitored for 48 hours. The average heart rate was 68 beats per minute with underlying sinus rhythm. The minimum heart rate of 53 beats per minute occurred at 4:37 AM and was sinus bradycardia. The maximum heart rate of 168 beats per minute occurred at 6:11 PM and was atrial tachycardia. Frequent sinus bradycardia was observed during the study, constituting 14.6% of the total heart beats. Very rare sinus tachycardia was noted, constituting less than 0.1% of the total beats. Rare supraventricular ectopic activity was observed, consisting of 594 isolated PACs, 16 atrial couplets, and 18 short runs of atrial tachycardia. The longest run of atrial tachycardia was 24 beats in duration, but most runs were 6 beats or less in duration. There was no evidence of atrial fibrillation/flutter. Rare ventricular ectopic activity was observed, consisting of 7 isolated PVCs. There was no evidence of ventricular tachycardia. There were no prolonged pauses and there was no evidence of atrioventricular block. The patient's lone reported symptomatic event correlated with normal sinus rhythm, with no abnormalities observed at that time.     CONCLUSIONS:  Sinus rhythm with an average heart rate of 68 beats per minute and frequent mild sinus bradycardia, likely physiologic in nature versus medication-induced.  Rare isolated PACs, constituting 0.3%  of the total beats. Multiple short runs of atrial tachycardia were observed, the longest of which was 24 beats in duration, but there was no evidence of atrial fibrillation/flutter. Very rare isolated PVCs. The patient's lone reported symptomatic event correlated with normal sinus rhythm.      Laiht Boyer MD  BAP/pap                    Electronically Signed by: Geraldine Johnson-, Other -Author on June 4, 2021 03:35:32 PM  Electronically Co-signed by: Laith Boyer MD -Reviewer on June 4, 2021 03:37:33 PM

## 2021-06-05 NOTE — PROGRESS NOTES
Progress Note      Patient Name: Geovanna Whitten   Patient ID: [id number]   Sex: Female   YOB: 1935    Primary Care Provider: Rivka MALHOTRA   Referring Provider: Rivka MALHOTRA    Visit Date: May 25, 2021    Provider: Laith Boyer MD   Location: [facility]   Location Address: [address] [place] [address], [address]   PARMJIT Sommer  [id number]   Location Phone: [phone]          Chief Complaint     Hospital follow-up.       History Of Present Illness  REFERRING CARE PROVIDER: Rivka MALHOTRA   Geovanna Whitten is a 86 year old /White female. Ms. Whitten presents for follow-up from her recent admission at [facility]. She was admitted on 05/17 with slurred speech and numbness and weakness of her left upper and lower extremity. She was diagnosed with TIA and fortunately, MRI of her brain did not show any evidence of stroke. She was noted to have a short episode of paroxysmal atrial fibrillation during her hospitalization and was started on chronic anticoagulation with Eliquis. She reports that she is tolerating this medication well with no bleeding problems. Echocardiogram obtained during her recent admission again showed normal left ventricular systolic function with estimated ejection fraction of 65% and no evidence of regional wall motion abnormalities. She was observed to have mild mitral regurgitation, as well as mild aortic regurgitation with mild to moderate aortic stenosis. The peak and mean gradients across her aortic valve were 31 and 17 mmHg, respectively. She does not report any episodes of chest pain/pressure or any palpitations, orthopnea, PND, peripheral edema, lightheadedness, or syncope. She does continue to have some mild left sided extremity weakness, but states that this is significantly improved from last week. She continues to have tremors and is on chronic therapy with Sinemet for Parkinson's disease. Her blood pressure was elevated today at 158/80.  "  PAST MEDICAL HISTORY: Hypertension; hyperlipidemia; moderate mitral regurgitation; moderate aortic regurgitation; mild pulmonary hypertension; osteoarthritis; glaucoma; essential tremor; hypothyroidism. PAST SURGICAL HISTORY: Hysterectomy; colonoscopy; bilateral cataract excision.   PSYCHOSOCIAL HISTORY: Denies alcohol consumption.   CURRENT MEDICATIONS: Medications have been reviewed and are as stated.      ALLERGIES:  Sulfa; Iodine.       Review of Systems  · Cardiovascular  o Denies  o : palpitations (fast, fluttering, or skipping beats), swelling (feet, ankles, hands), shortness of breath while walking or lying flat, chest pain or angina pectoris   · Respiratory  o Admits  o : chronic or frequent cough      Vitals  Date Time BP Position Site L\R Cuff Size HR RR TEMP (F) WT  HT  BMI kg/m2 BSA m2 O2 Sat FR L/min FiO2 HC       05/25/2021 10:39 /60 Sitting    78 - R   138lbs 0oz 5'  4\" 23.69 1.68       05/25/2021 10:39 /60 Sitting                       Physical Examination  · Respiratory  o Auscultation of Lungs  o : Clear to auscultation bilaterally. No wheezing, rales, or rhonchi. Normal effort with no increased work of breathing.   · Cardiovascular  o Heart  o : Regular rate and rythm, normal S1and S2. There is a mild systolic murmr at the apex with a systolic and diastolic murmur noted at the right upper sternal border. No S3 or S4 gallop.   · Gastrointestinal  o Abdominal Examination  o : Soft, nontender, nondistended. Normal active bowel sounds throughout. No hepatomeagly.  · Extremities  o Extremities  o : No cyanosis, clubbing, or edema. 2+ radial pulses bilaterally.      CONSTITUTIONAL: Well-developed, well-nourished, alert and oriented. No acute distress.    NECK: Supple, no JVD, no carotid bruit, no masses.  SKIN: Warm and dry, no jaundice, no rashes or lesions.              Assessment     1.  Recently diagnosed episode of paroxysmal atrial fibrillation. She is no back in sinus rhythm and " has been started on chronic anticoagulation with Eliquis due to her elevated CHADS2-VASc score.  2.  Recent TIA.  3.  Essential hypertension, remains suboptimally controlled on current medications.  4.  Hyperlipidemia.  5.  Mild to moderate aortic stenosis with mild aortic regurgitation per recent echocardiogram.  6.  Mild diastolic dysfunction.  7.  History of mild nonobstructive carotid stenosis.         Plan     We will continue her current medications, including chronic anticoagulation with Eliquis. We will also start carvedilol 6.25 mg twice daily at this time for rate control of paroxysmal atrial fibrillation as well improved control for her hypertension. She was started on statin therapy with atorvastatin during her hospitalization and seems to be tolerating it for now, but she does have a history of severe statin intolerance in the past. She also previously failed to tolerate ezetimibe and declines consideration of a PCSK9 inhibitor at this point. I am going to obtain a 48 hour Holter monitor once she has been on carvedilol for 1 to 2 weeks to assess her overall heart rate control and the frequency of any potential atrial fibrillation episodes. I will plan to see her back in the office in several months for reassessment.         Laith Boyer MD  BP/vh                Electronically Signed by: Nan Nina-, OT -Author on May 27, 2021 12:30:48 PM  Electronically Co-signed by: Laith Boyer MD -Reviewer on May 31, 2021 08:01:26 AM

## 2021-06-07 DIAGNOSIS — F33.0 MAJOR DEPRESSIVE DISORDER, RECURRENT, MILD (HCC): Primary | ICD-10-CM

## 2021-06-07 PROBLEM — L84 CORNS AND CALLOSITY: Status: ACTIVE | Noted: 2021-06-07

## 2021-06-07 PROBLEM — I47.1 PAROXYSMAL SUPRAVENTRICULAR TACHYCARDIA: Status: ACTIVE | Noted: 2021-06-07

## 2021-06-07 PROBLEM — M51.36 DEGENERATION OF LUMBAR INTERVERTEBRAL DISC: Status: ACTIVE | Noted: 2020-01-23

## 2021-06-07 PROBLEM — M41.9 SCOLIOSIS: Status: ACTIVE | Noted: 2020-01-23

## 2021-06-07 PROBLEM — I47.10 PAROXYSMAL SUPRAVENTRICULAR TACHYCARDIA: Status: ACTIVE | Noted: 2021-06-07

## 2021-06-07 PROBLEM — R00.2 AWARENESS OF HEARTBEATS: Status: ACTIVE | Noted: 2021-06-07

## 2021-06-07 PROBLEM — M19.90 ARTHRITIS: Status: ACTIVE | Noted: 2021-06-07

## 2021-06-07 PROBLEM — N32.9 BLADDER PROBLEM: Status: ACTIVE | Noted: 2021-06-07

## 2021-06-07 PROBLEM — IMO0002 THORACIC OR LUMBOSACRAL NEURITIS OR RADICULITIS: Status: ACTIVE | Noted: 2020-01-23

## 2021-06-07 PROBLEM — E07.9 DISORDER OF THYROID: Status: ACTIVE | Noted: 2021-06-07

## 2021-06-07 PROBLEM — R42 VERTIGO: Status: ACTIVE | Noted: 2021-06-07

## 2021-06-07 PROBLEM — K21.9 ESOPHAGEAL REFLUX: Status: ACTIVE | Noted: 2021-06-07

## 2021-06-07 PROBLEM — M10.9 GOUT: Status: ACTIVE | Noted: 2021-06-07

## 2021-06-07 PROBLEM — H91.90 HEARING DECREASED: Status: ACTIVE | Noted: 2021-06-07

## 2021-06-07 PROBLEM — G43.909 MIGRAINES: Status: ACTIVE | Noted: 2021-06-07

## 2021-06-07 PROBLEM — E78.5 HYPERLIPEMIA: Status: ACTIVE | Noted: 2021-06-07

## 2021-06-07 PROBLEM — M43.10 ACQUIRED SPONDYLOLISTHESIS: Status: ACTIVE | Noted: 2020-01-23

## 2021-06-07 PROBLEM — I10 HYPERTENSION: Status: ACTIVE | Noted: 2021-06-07

## 2021-06-07 RX ORDER — CITALOPRAM 20 MG/1
TABLET ORAL
COMMUNITY
Start: 2021-03-08 | End: 2021-06-07 | Stop reason: SDUPTHER

## 2021-06-07 RX ORDER — FERROUS SULFATE 325(65) MG
TABLET ORAL
COMMUNITY

## 2021-06-07 RX ORDER — CYCLOBENZAPRINE HCL 10 MG
TABLET ORAL
COMMUNITY

## 2021-06-07 RX ORDER — CITALOPRAM 20 MG/1
TABLET ORAL
Qty: 90 TABLET | Refills: 1 | OUTPATIENT
Start: 2021-06-07

## 2021-06-07 RX ORDER — LEVOTHYROXINE SODIUM 0.07 MG/1
TABLET ORAL
COMMUNITY
Start: 2021-01-18

## 2021-06-07 RX ORDER — AMLODIPINE BESYLATE 5 MG/1
TABLET ORAL
COMMUNITY
Start: 2021-04-16 | End: 2021-07-14 | Stop reason: SDUPTHER

## 2021-06-07 RX ORDER — CITALOPRAM 20 MG/1
20 TABLET ORAL DAILY
Qty: 90 TABLET | Refills: 1 | Status: SHIPPED | OUTPATIENT
Start: 2021-06-07 | End: 2021-09-05

## 2021-06-07 RX ORDER — MULTIPLE VITAMINS W/ MINERALS TAB 9MG-400MCG
TAB ORAL
COMMUNITY
End: 2021-06-07 | Stop reason: SDUPTHER

## 2021-06-07 RX ORDER — ATORVASTATIN CALCIUM 40 MG/1
TABLET, FILM COATED ORAL
COMMUNITY

## 2021-06-07 RX ORDER — LATANOPROST 50 UG/ML
SOLUTION/ DROPS OPHTHALMIC
COMMUNITY
Start: 2021-04-27

## 2021-06-07 RX ORDER — DOXYCYCLINE 100 MG/1
CAPSULE ORAL
COMMUNITY
Start: 2021-05-21 | End: 2021-06-07 | Stop reason: SDUPTHER

## 2021-06-18 ENCOUNTER — OFFICE VISIT (OUTPATIENT)
Dept: NEUROLOGY | Facility: CLINIC | Age: 86
End: 2021-06-18

## 2021-06-18 VITALS
BODY MASS INDEX: 23.56 KG/M2 | HEIGHT: 64 IN | TEMPERATURE: 97.8 F | HEART RATE: 64 BPM | DIASTOLIC BLOOD PRESSURE: 63 MMHG | WEIGHT: 138 LBS | SYSTOLIC BLOOD PRESSURE: 169 MMHG

## 2021-06-18 DIAGNOSIS — G20 PARKINSON'S DISEASE (HCC): Primary | ICD-10-CM

## 2021-06-18 PROBLEM — G20.A1 PARKINSON'S DISEASE: Status: ACTIVE | Noted: 2021-06-18

## 2021-06-18 PROCEDURE — 99214 OFFICE O/P EST MOD 30 MIN: CPT | Performed by: PSYCHIATRY & NEUROLOGY

## 2021-06-18 RX ORDER — PRAMIPEXOLE DIHYDROCHLORIDE 0.5 MG/1
0.5 TABLET ORAL 3 TIMES DAILY
Qty: 270 TABLET | Refills: 1 | Status: SHIPPED | OUTPATIENT
Start: 2021-06-18 | End: 2022-01-19 | Stop reason: SDUPTHER

## 2021-06-18 RX ORDER — PRAMIPEXOLE DIHYDROCHLORIDE 0.5 MG/1
0.5 TABLET ORAL 3 TIMES DAILY
COMMUNITY
End: 2021-06-18 | Stop reason: SDUPTHER

## 2021-06-18 NOTE — ASSESSMENT & PLAN NOTE
I discussed with her that she needs to take her medications 3 times a day.  I gave her a schedule to take it at 8 AM, 12 noon and 4 PM.  She is to take carbidopa/levodopa 25/100 2 tablets 3 times a day and pramipexole 0.5 mg 3 times a day.  I discussed with her that I will not order a DaTscan since she is moving to [place] in an assisted living solidity and is going to be  followed up by another neurologist at that facility.  She is to use her walker at all times.  She is to continue taking baby aspirin.

## 2021-06-18 NOTE — PROGRESS NOTES
"Chief Complaint  Transient Ischemic Attack, Tremors, and Follow-up    Subjective          Geovanna Whitten is a 86 y.o. female who presents to [facility]  History of Present Illness  86-year-old woman here for follow-up of her Parkinson's tremor.  She states that she was admitted for TIA in the hospital and was seen by another neurologist.  She is taking carbidopa levodopa 25/100 2 tablets 3 times a day or 4 times a day.  She cannot remember.  She is also taking pramipexole twice a day.  She states that she stopped driving last week.  She is also going to an assisted living in August because she can no longer take care of herself.  She is going to [place] and then find new physicians.    She has tremor noted in the right leg and she has been treated for Parkinson's disease for over 5 years.  She states that she is using a walker all the time at home.  She is also using a cane when she is out.  He has not fallen down.  Graph she is able to take care of herself but not the housework.    Objective   Vital Signs:   /63 (BP Location: Right arm, Patient Position: Sitting)   Pulse 64   Temp 97.8 °F (36.6 °C)   Ht 162.6 cm (64.02\")   Wt 62.6 kg (138 lb)   BMI 23.68 kg/m²     Physical Exam   She is alert, fluent, visit, follows commands well.  There is no facial bradykinesia.  There is no tremor noted upper extremity.  During the interview there is a tremor noted in the right ankle and foot parkinsonian frequency.  On Station and gait she is able to ambulate without a walker or a cane with provision.  Arm swing is normal and turning is intact.  She feels more steady with a four-point cane.  She has difficulty with tiptoe and heel walk with or without a cane.  Heart is regular with a normal in rate.     Assessment and Plan  Diagnoses and all orders for this visit:    1. Parkinson's disease (CMS/[facility]) (Primary)  Assessment & Plan:  I discussed with her that she needs " to take her medications 3 times a day.  I gave her a schedule to take it at 8 AM, 12 noon and 4 PM.  She is to take carbidopa/levodopa 25/100 2 tablets 3 times a day and pramipexole 0.5 mg 3 times a day.  I discussed with her that I will not order a DaTscan since she is moving to [place] in an assisted living solidity and is going to be  followed up by another neurologist at that facility.  She is to use her walker at all times.  She is to continue taking baby aspirin.         Total time spent with the patient and coordinating patient care was 45 minutes.    Follow Up  No follow-ups on file.  Patient was given instructions and counseling regarding her condition or for health maintenance advice. Please see specific information pulled into the AVS if appropriate.

## 2021-06-24 ENCOUNTER — TELEPHONE (OUTPATIENT)
Dept: FAMILY MEDICINE CLINIC | Facility: CLINIC | Age: 86
End: 2021-06-24

## 2021-06-24 NOTE — TELEPHONE ENCOUNTER
Caller: RAJNI    Relationship to patient: GRANDDAUGHTER    Best call back number: [phone]    Patient is needing: TOI FROM [facility] CALLED IN AND STATED THAT THE PATIENT HAD FALLEN ABOUT A WEEK AGO AND HAS A BRUISED RIGHT HIP AND IS COMPLAINING OF PAIN IN BOTH HIPS. TOI AND PATIENT SAID TO CALL HER GRANDDAUGHTER RAJNI TO POSSIBLY SET AN APPOINTMENT.PLEASE CALL PATIENT'S GRANDDAUGHTER WITH ADVICE.

## 2021-06-26 ENCOUNTER — APPOINTMENT (OUTPATIENT)
Dept: CT IMAGING | Facility: HOSPITAL | Age: 86
End: 2021-06-26

## 2021-06-26 ENCOUNTER — HOSPITAL ENCOUNTER (EMERGENCY)
Facility: HOSPITAL | Age: 86
Discharge: HOME | End: 2021-06-26
Attending: EMERGENCY MEDICINE | Admitting: EMERGENCY MEDICINE

## 2021-06-26 ENCOUNTER — APPOINTMENT (OUTPATIENT)
Dept: GENERAL RADIOLOGY | Facility: HOSPITAL | Age: 86
End: 2021-06-26

## 2021-06-26 VITALS
SYSTOLIC BLOOD PRESSURE: 116 MMHG | RESPIRATION RATE: 16 BRPM | BODY MASS INDEX: 25.43 KG/M2 | TEMPERATURE: 98.2 F | DIASTOLIC BLOOD PRESSURE: 45 MMHG | HEART RATE: 59 BPM | OXYGEN SATURATION: 93 % | HEIGHT: 63 IN | WEIGHT: 143.5 LBS

## 2021-06-26 DIAGNOSIS — R55 SYNCOPE, UNSPECIFIED SYNCOPE TYPE: Primary | ICD-10-CM

## 2021-06-26 LAB
ALBUMIN SERPL-MCNC: 4.1 G/DL (ref 3.5–5.2)
ALBUMIN/GLOB SERPL: 1.7 G/DL
ALP SERPL-CCNC: 24 U/L (ref 39–117)
ALT SERPL W P-5'-P-CCNC: <5 U/L (ref 1–33)
ANION GAP SERPL CALCULATED.3IONS-SCNC: 8.3 MMOL/L (ref 5–15)
AST SERPL-CCNC: 16 U/L (ref 1–32)
BASOPHILS # BLD AUTO: 0.06 10*3/MM3 (ref 0–0.2)
BASOPHILS NFR BLD AUTO: 1.2 % (ref 0–1.5)
BILIRUB SERPL-MCNC: 0.2 MG/DL (ref 0–1.2)
BILIRUB UR QL STRIP: NEGATIVE
BUN SERPL-MCNC: 16 MG/DL (ref 8–23)
BUN/CREAT SERPL: 20.5 (ref 7–25)
CALCIUM SPEC-SCNC: 8.7 MG/DL (ref 8.6–10.5)
CHLORIDE SERPL-SCNC: 103 MMOL/L (ref 98–107)
CLARITY UR: CLEAR
CO2 SERPL-SCNC: 26.7 MMOL/L (ref 22–29)
COLOR UR: YELLOW
CREAT SERPL-MCNC: 0.78 MG/DL (ref 0.57–1)
DEPRECATED RDW RBC AUTO: 47.8 FL (ref 37–54)
EOSINOPHIL # BLD AUTO: 0.11 10*3/MM3 (ref 0–0.4)
EOSINOPHIL NFR BLD AUTO: 2.1 % (ref 0.3–6.2)
ERYTHROCYTE [DISTWIDTH] IN BLOOD BY AUTOMATED COUNT: 13.9 % (ref 12.3–15.4)
GFR SERPL CREATININE-BSD FRML MDRD: 70 ML/MIN/1.73
GLOBULIN UR ELPH-MCNC: 2.4 GM/DL
GLUCOSE BLDC GLUCOMTR-MCNC: 85 MG/DL (ref 70–130)
GLUCOSE SERPL-MCNC: 98 MG/DL (ref 65–99)
GLUCOSE UR STRIP-MCNC: NEGATIVE MG/DL
HCT VFR BLD AUTO: 29.5 % (ref 34–46.6)
HGB BLD-MCNC: 9.4 G/DL (ref 12–15.9)
HGB UR QL STRIP.AUTO: NEGATIVE
HOLD SPECIMEN: NORMAL
HOLD SPECIMEN: NORMAL
IMM GRANULOCYTES # BLD AUTO: 0.01 10*3/MM3 (ref 0–0.05)
IMM GRANULOCYTES NFR BLD AUTO: 0.2 % (ref 0–0.5)
KETONES UR QL STRIP: NEGATIVE
LEUKOCYTE ESTERASE UR QL STRIP.AUTO: NEGATIVE
LYMPHOCYTES # BLD AUTO: 1.98 10*3/MM3 (ref 0.7–3.1)
LYMPHOCYTES NFR BLD AUTO: 38.1 % (ref 19.6–45.3)
MAGNESIUM SERPL-MCNC: 1.9 MG/DL (ref 1.6–2.4)
MCH RBC QN AUTO: 30.1 PG (ref 26.6–33)
MCHC RBC AUTO-ENTMCNC: 31.9 G/DL (ref 31.5–35.7)
MCV RBC AUTO: 94.6 FL (ref 79–97)
MONOCYTES # BLD AUTO: 0.47 10*3/MM3 (ref 0.1–0.9)
MONOCYTES NFR BLD AUTO: 9 % (ref 5–12)
NEUTROPHILS NFR BLD AUTO: 2.57 10*3/MM3 (ref 1.7–7)
NEUTROPHILS NFR BLD AUTO: 49.4 % (ref 42.7–76)
NITRITE UR QL STRIP: NEGATIVE
NRBC BLD AUTO-RTO: 0 /100 WBC (ref 0–0.2)
PH UR STRIP.AUTO: 6.5 [PH] (ref 5–8)
PLATELET # BLD AUTO: 283 10*3/MM3 (ref 140–450)
PMV BLD AUTO: 9.2 FL (ref 6–12)
POTASSIUM SERPL-SCNC: 4.2 MMOL/L (ref 3.5–5.2)
PROT SERPL-MCNC: 6.5 G/DL (ref 6–8.5)
PROT UR QL STRIP: NEGATIVE
RBC # BLD AUTO: 3.12 10*6/MM3 (ref 3.77–5.28)
SODIUM SERPL-SCNC: 138 MMOL/L (ref 136–145)
SP GR UR STRIP: 1.01 (ref 1–1.03)
TROPONIN T SERPL-MCNC: <0.01 NG/ML (ref 0–0.03)
UROBILINOGEN UR QL STRIP: NORMAL
WBC # BLD AUTO: 5.2 10*3/MM3 (ref 3.4–10.8)
WHOLE BLOOD HOLD SPECIMEN: NORMAL

## 2021-06-26 PROCEDURE — 85025 COMPLETE CBC W/AUTO DIFF WBC: CPT

## 2021-06-26 PROCEDURE — 73590 X-RAY EXAM OF LOWER LEG: CPT

## 2021-06-26 PROCEDURE — 84484 ASSAY OF TROPONIN QUANT: CPT

## 2021-06-26 PROCEDURE — 73502 X-RAY EXAM HIP UNI 2-3 VIEWS: CPT

## 2021-06-26 PROCEDURE — 71045 X-RAY EXAM CHEST 1 VIEW: CPT

## 2021-06-26 PROCEDURE — 82962 GLUCOSE BLOOD TEST: CPT

## 2021-06-26 PROCEDURE — 99284 EMERGENCY DEPT VISIT MOD MDM: CPT

## 2021-06-26 PROCEDURE — 72125 CT NECK SPINE W/O DYE: CPT

## 2021-06-26 PROCEDURE — 80053 COMPREHEN METABOLIC PANEL: CPT

## 2021-06-26 PROCEDURE — 70450 CT HEAD/BRAIN W/O DYE: CPT

## 2021-06-26 PROCEDURE — 93005 ELECTROCARDIOGRAM TRACING: CPT

## 2021-06-26 PROCEDURE — 81003 URINALYSIS AUTO W/O SCOPE: CPT

## 2021-06-26 PROCEDURE — 83735 ASSAY OF MAGNESIUM: CPT

## 2021-06-26 RX ORDER — SODIUM CHLORIDE 0.9 % (FLUSH) 0.9 %
10 SYRINGE (ML) INJECTION AS NEEDED
Status: DISCONTINUED | OUTPATIENT
Start: 2021-06-26 | End: 2021-06-27 | Stop reason: HOSPADM

## 2021-06-27 LAB — QT INTERVAL: 425 MS

## 2021-07-02 ENCOUNTER — TELEPHONE (OUTPATIENT)
Dept: FAMILY MEDICINE CLINIC | Facility: CLINIC | Age: 86
End: 2021-07-02

## 2021-07-02 NOTE — TELEPHONE ENCOUNTER
Caller: DANNA    Relationship: Other    Best call back number: [phone]    What is the best time to reach you: ANYTIME    Who are you requesting to speak with (clinical staff, provider,  specific staff member): CLINICAL      What was the call regarding: DANNA STATED THEY WILL CONTINUE PHYSICAL THERAPY ONCE A WEEK FOR THE NEXT THREE WEEKS FOR THE PATIENT.    Do you require a callback: NO

## 2021-07-08 ENCOUNTER — TELEPHONE (OUTPATIENT)
Dept: FAMILY MEDICINE CLINIC | Facility: CLINIC | Age: 86
End: 2021-07-08

## 2021-07-08 NOTE — TELEPHONE ENCOUNTER
[phone] SOURAV EATON [facility] CALLED ABOUT A SCRIPT FOR A WHEELCHAIR BEFORE SHE GOES INTO AN ASSIT LIVING LOCATION. IS THIS SOMETHING YOU CAN WRITE?

## 2021-07-09 ENCOUNTER — TELEPHONE (OUTPATIENT)
Dept: FAMILY MEDICINE CLINIC | Facility: CLINIC | Age: 86
End: 2021-07-09

## 2021-07-09 NOTE — TELEPHONE ENCOUNTER
Caller: MONTY    Relationship:     Best call back number: [phone]    What form or medical record are you requesting: LAST 2 OFFICE NOTES    Who is requesting this form or medical record from you:     How would you like to receive the form or medical records (pick-up, mail, fax): FAX  If fax, what is the fax number:[phone]   If mail, what is the address:   If pick-up, provide patient with address and location details    Timeframe paperwork needed: ASAP    Additional notes: MONTY IS IN THE PROCESS OF GETTING PATIENT A WHEELCHAIR, SHE NEEDS THE LAST 2 OFFICE NOTES AND AN ORDER FOR A LOANER MANUAL WHEELCHAIR UNTIL PATIENT'S WHEELCHAIR COMES

## 2021-07-14 DIAGNOSIS — E03.9 ACQUIRED HYPOTHYROIDISM: ICD-10-CM

## 2021-07-14 DIAGNOSIS — I10 ESSENTIAL HYPERTENSION: Primary | ICD-10-CM

## 2021-07-14 RX ORDER — AMLODIPINE BESYLATE 5 MG/1
TABLET ORAL
Qty: 90 TABLET | Refills: 1 | Status: SHIPPED | OUTPATIENT
Start: 2021-07-14

## 2021-07-14 RX ORDER — LEVOTHYROXINE SODIUM 0.07 MG/1
TABLET ORAL
Qty: 90 TABLET | Refills: 1 | Status: SHIPPED | OUTPATIENT
Start: 2021-07-14 | End: 2021-12-16 | Stop reason: SDUPTHER

## 2021-07-15 VITALS
HEART RATE: 78 BPM | WEIGHT: 138 LBS | SYSTOLIC BLOOD PRESSURE: 168 MMHG | BODY MASS INDEX: 23.56 KG/M2 | DIASTOLIC BLOOD PRESSURE: 60 MMHG | HEIGHT: 64 IN

## 2021-07-15 VITALS
HEIGHT: 64 IN | OXYGEN SATURATION: 96 % | HEART RATE: 70 BPM | WEIGHT: 138 LBS | BODY MASS INDEX: 23.56 KG/M2 | DIASTOLIC BLOOD PRESSURE: 60 MMHG | SYSTOLIC BLOOD PRESSURE: 173 MMHG

## 2021-07-22 ENCOUNTER — LAB REQUISITION (OUTPATIENT)
Dept: LAB | Facility: HOSPITAL | Age: 86
End: 2021-07-22

## 2021-07-22 ENCOUNTER — TELEPHONE (OUTPATIENT)
Dept: FAMILY MEDICINE CLINIC | Facility: CLINIC | Age: 86
End: 2021-07-22

## 2021-07-22 DIAGNOSIS — R30.0 DYSURIA: ICD-10-CM

## 2021-07-22 LAB
BILIRUB UR QL STRIP: NEGATIVE
CLARITY UR: CLEAR
COLOR UR: YELLOW
GLUCOSE UR STRIP-MCNC: NEGATIVE MG/DL
HGB UR QL STRIP.AUTO: NEGATIVE
KETONES UR QL STRIP: NEGATIVE
LEUKOCYTE ESTERASE UR QL STRIP.AUTO: NEGATIVE
NITRITE UR QL STRIP: NEGATIVE
PH UR STRIP.AUTO: 6.5 [PH] (ref 5–8)
PROT UR QL STRIP: NEGATIVE
SP GR UR STRIP: 1.01 (ref 1–1.03)
UROBILINOGEN UR QL STRIP: NORMAL

## 2021-07-22 PROCEDURE — 81003 URINALYSIS AUTO W/O SCOPE: CPT | Performed by: NURSE PRACTITIONER

## 2021-07-22 NOTE — TELEPHONE ENCOUNTER
Caller: OLAMIDE RODRIGUEZ    Relationship: [facility]    Best call back number: [phone]    What orders are you requesting (i.e. lab or imaging):VERBAL ORDER FOR URINALYSES     Additional notes: PLEASE ADVISE

## 2021-07-26 ENCOUNTER — TELEPHONE (OUTPATIENT)
Dept: FAMILY MEDICINE CLINIC | Facility: CLINIC | Age: 86
End: 2021-07-26

## 2021-07-26 NOTE — TELEPHONE ENCOUNTER
----- Message from ROSCOE March sent at 7/23/2021  2:06 PM EDT -----  Appears normal will await culture

## 2021-12-06 RX ORDER — LISINOPRIL 20 MG/1
20 TABLET ORAL DAILY
Qty: 30 TABLET | Refills: 0 | Status: SHIPPED | OUTPATIENT
Start: 2021-12-06 | End: 2022-01-17 | Stop reason: SDUPTHER

## 2021-12-06 NOTE — TELEPHONE ENCOUNTER
Caller: Fish Geovanna Arredondos    Relationship: Self    Best call back number: [phone]     Requested Prescriptions:   Requested Prescriptions     Pending Prescriptions Disp Refills   • lisinopril (PRINIVIL,ZESTRIL) 20 MG tablet       Sig: Take 1 tablet by mouth Daily.    levothyroxine (SYNTHROID, LEVOTHROID) 75 MCG tablet    Pharmacy where request should be sent: [place] [facility] [phone] [place] [phone] FX     Additional details provided by patient: PATIENT WOULD ALSO LIKE TO KNOW WHETHER OR NOT THE OFFICE RECEIVED A FAX FROM [facility]. SHE STATES THAT IT WAS A QUESTIONNAIRE, AND WOULD LIKE A CALL BACK IF IT HAS NOT BEEN RECEIVED.     Does the patient have less than a 3 day supply:  [] Yes  [x] No    Dominique Gibson Rep   12/06/21 11:48 EST      Have You Had Dysport Before?: has had dysport

## 2021-12-16 DIAGNOSIS — E03.9 ACQUIRED HYPOTHYROIDISM: ICD-10-CM

## 2021-12-16 NOTE — TELEPHONE ENCOUNTER
Caller: panfiloAnn linaresi    Relationship: Emergency Contact    Best call back number: [phone]     Requested Prescriptions:   Requested Prescriptions     Pending Prescriptions Disp Refills   • levothyroxine (SYNTHROID, LEVOTHROID) 75 MCG tablet 90 tablet 1     Sig: Take 1 tablet by mouth Daily.        Pharmacy where request should be sent: [place] [facility] [phone] [place], [place] [phone] FX     Additional details provided by patient:     Does the patient have less than a 3 day supply:  [x] Yes  [] No    Dominique Sanders Rep   12/16/21 15:20 EST

## 2021-12-17 RX ORDER — LEVOTHYROXINE SODIUM 0.07 MG/1
75 TABLET ORAL DAILY
Qty: 90 TABLET | Refills: 0 | Status: SHIPPED | OUTPATIENT
Start: 2021-12-17

## 2022-01-17 RX ORDER — LISINOPRIL 20 MG/1
20 TABLET ORAL DAILY
Qty: 30 TABLET | Refills: 0 | Status: SHIPPED | OUTPATIENT
Start: 2022-01-17

## 2022-01-19 RX ORDER — PRAMIPEXOLE DIHYDROCHLORIDE 0.5 MG/1
0.5 TABLET ORAL 3 TIMES DAILY
Qty: 270 TABLET | Refills: 1 | Status: SHIPPED | OUTPATIENT
Start: 2022-01-19

## 2022-01-19 NOTE — TELEPHONE ENCOUNTER
Pt requesting refills on Pramipexole per fax from pharmacy. She needs enough to last until her f/u on 06/18/2022. Last prescription was sent in on 06/18/2021 for 6 months. She is to continue taking her meds as prescribed until her f/u per last office visit note. Refills sent in to last until f/u.

## 2022-01-20 ENCOUNTER — TELEPHONE (OUTPATIENT)
Dept: NEUROLOGY | Facility: CLINIC | Age: 87
End: 2022-01-20

## 2022-01-20 NOTE — TELEPHONE ENCOUNTER
"Caller: CARLOS    Relationship: W/ [facility]    Best call back number: [phone]    What form or medical record are you requesting: CARLOS CALLED TO OBTAIN CLARIFICATION ON PT'S CARBIDOPA-LEVODOPA MEDICATION. CARLOS STATES PT'S BOTTLE STATES \"CARBIDOPA-LEVODOPA (SINEMET) 25-100MG; 2-1/2 TABLETS 5X DAILY\", WHEREAS PT HAS DOCUMENTATION THAT SHE TAKES \"CARBIDOPA-LEVODOPA (SINEMET) 25-100MG; 2 TABLETS 3X DAILY\"    CARLOS NEEDS CLARIFICATION. DOCUMENTATION CAN BE FAXED TO FAX # LISTED BELOW.    How would you like to receive the form or medical records (pick-up, mail, fax): FAX  If fax, what is the fax number: [phone]    Timeframe paperwork needed: EARLIEST CONVENIENCE    PLEASE REVIEW AND ADVISE.  "

## 2022-01-21 NOTE — TELEPHONE ENCOUNTER
"I called the facility and spoke with Michelle on second floor where pt is staying and advised her that the past prescription and office note stated \"She is to take carbidopa/levodopa 25/100 2 tablets 3 times a day and pramipexole 0.5 mg 3 times a day.\" but this was from 06/18/2021 and the pt was supposed to establish care with neuro when she moved there and Michelle said she understood. I then faxed the most recent office note to them @ [phone] and asked that she call with further questions or concerns and she said she would.   "

## 2023-01-23 DIAGNOSIS — F33.0 MAJOR DEPRESSIVE DISORDER, RECURRENT, MILD: ICD-10-CM

## 2023-01-23 RX ORDER — ACETAMINOPHEN 325 MG/1
TABLET ORAL
Qty: 60 TABLET | Refills: 0 | OUTPATIENT
Start: 2023-01-23

## 2023-01-23 RX ORDER — CITALOPRAM 20 MG/1
TABLET ORAL
Qty: 30 TABLET | Refills: 0 | OUTPATIENT
Start: 2023-01-23

## 2023-04-26 ENCOUNTER — OFFICE VISIT (OUTPATIENT)
Dept: NEUROSURGERY | Facility: CLINIC | Age: 88
End: 2023-04-26
Payer: MEDICARE

## 2023-04-26 VITALS
DIASTOLIC BLOOD PRESSURE: 59 MMHG | HEART RATE: 75 BPM | HEIGHT: 63 IN | BODY MASS INDEX: 23.81 KG/M2 | WEIGHT: 134.4 LBS | SYSTOLIC BLOOD PRESSURE: 188 MMHG

## 2023-04-26 DIAGNOSIS — S22.080A CLOSED WEDGE COMPRESSION FRACTURE OF T11 VERTEBRA, INITIAL ENCOUNTER: Primary | ICD-10-CM
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
## 2023-04-26 RX ORDER — ACETAMINOPHEN 325 MG/1
TABLET ORAL
COMMUNITY
Start: 2023-02-25

## 2023-04-26 RX ORDER — METOPROLOL SUCCINATE 25 MG/1
TABLET, EXTENDED RELEASE ORAL
COMMUNITY
Start: 2023-03-21

## 2023-04-26 RX ORDER — DIAPER,BRIEF,INFANT-TODD,DISP
EACH MISCELLANEOUS
COMMUNITY
Start: 2023-04-07

## 2023-04-26 RX ORDER — MAGNESIUM OXIDE 400 MG/1
400 TABLET ORAL DAILY
COMMUNITY

## 2023-04-26 RX ORDER — HYDROCODONE BITARTRATE AND ACETAMINOPHEN 5; 325 MG/1; MG/1
TABLET ORAL
COMMUNITY
Start: 2023-04-17

## 2023-04-26 RX ORDER — DONEPEZIL HYDROCHLORIDE 5 MG/1
TABLET, FILM COATED ORAL
COMMUNITY
Start: 2023-04-07

## 2023-04-26 RX ORDER — LATANOPROST 50 UG/ML
1 SOLUTION/ DROPS OPHTHALMIC
COMMUNITY

## 2023-04-26 NOTE — PROGRESS NOTES
"Chief Complaint  Back Pain (Low back pain )    Subjective          Geovanna Whitten who is a 88 y.o. year old female who presents to [facility] for evaluation of T11 compression fx.    Pt is from a nursing facility. She is a poor historian. Daughter and nursing facility are present and provide the majority of the HPI today.     The patient complains of pain located in the thoracic  spine. The pain came on acutely. Pt reported to her daughter that she fell at the nursing home shortly after Easter. The fall was unwitnessed. She started having pain in the right ribs. Then progressed to the back. She had an XR of the spine, demonstrating a moderate T11 compression fracture. The pain scale level is 10.  The pain does radiate. Dermatomes are located on right Thoracic at: a non-specific dermatome...  The pain is constant and described as aching.  The pain is worse at no particular time of day. Patient states nothing worsens the pain. Patient states rest and pain medication makes the pain better.    Associated Symptoms Include: Denies numbness and tingling  Conservative Interventions Include: Pain Medications that were somewhat effective.    Was this the result of an injury or accident?: Yes, Fall    History of Previous Spinal Surgery?: No    Nicotine use:  non-smoker    BMI: Body mass index is 24.19 kg/m².      Review of Systems   Musculoskeletal: Positive for arthralgias and back pain.   All other systems reviewed and are negative.       Objective   Vital Signs:   BP (!) 188/59   Pulse 75   Ht 158.8 cm (62.5\")   Wt 61 kg (134 lb 6.4 oz)   BMI 24.19 kg/m²       Physical Exam  Vitals reviewed.   Constitutional:       Appearance: Normal appearance.   Musculoskeletal:      Thoracic back: Tenderness (lower thoracic) present.      Lumbar back: Tenderness present. Negative right straight leg raise test and negative left straight leg raise test.      Right hip: No tenderness. " Normal range of motion.      Left hip: No tenderness. Normal range of motion.   Neurological:      Mental Status: She is alert and oriented to person, place, and time.      Motor: Motor strength is normal.      Gait: Gait is intact.      Deep Tendon Reflexes:      Reflex Scores:       Patellar reflexes are 2+ on the right side and 2+ on the left side.       Achilles reflexes are 2+ on the right side and 2+ on the left side.       Neurologic Exam     Mental Status   Oriented to person, place, and time.   Level of consciousness: alert    Motor Exam   Muscle bulk: normal  Overall muscle tone: normal    Strength   Strength 5/5 throughout.     Sensory Exam   Light touch normal.     Gait, Coordination, and Reflexes     Gait  Gait: normal    Reflexes   Right patellar: 2+  Left patellar: 2+  Right achilles: 2+  Left achilles: 2+  Right ankle clonus: absent  Left ankle clonus: absent       Result Review :       Data reviewed: Radiologic studies XR on 4/17/23 reports moderate T11 compression fracture.          Assessment and Plan    Diagnoses and all orders for this visit:    1. Closed wedge compression fracture of T11 vertebra, initial encounter (Primary)  -     XR Spine Thoracolumbar 2 View; Future    2. Fall, subsequent encounter    Pt presenting for evaluation of acute back pain following an unwitnessed fall in the nursing home. She had an XR in the facility, demonstrating at T11 compression fracture. Vertebral fractures typically take 3-6 months to fully heal. Risks for delayed healing include patient's age, osteoporosis, female sex, risk for falls.     Will order TLSO brace to wear when patient is out of bed, greater than 30 degrees. She can take off to shower and when sleeping.     Will repeat XR in one month.     Follow up in 6 weeks.    I spent 40 minutes caring for Geovanna on this date of service. This time includes time spent by me in the following activities:preparing for the visit, reviewing tests, obtaining  and/or reviewing a separately obtained history, performing a medically appropriate examination and/or evaluation , counseling and educating the patient/family/caregiver, ordering medications, tests, or procedures, documenting information in the medical record, independently interpreting results and communicating that information with the patient/family/caregiver and care coordination.    Follow Up   Return in about 6 weeks (around 6/7/2023).  Patient was given instructions and counseling regarding her condition or for health maintenance advice.

## 2023-06-05 ENCOUNTER — TELEPHONE (OUTPATIENT)
Dept: NEUROSURGERY | Facility: CLINIC | Age: 88
End: 2023-06-05
Payer: MEDICARE

## 2023-06-14 ENCOUNTER — OFFICE VISIT (OUTPATIENT)
Dept: NEUROSURGERY | Facility: CLINIC | Age: 88
End: 2023-06-14
Payer: MEDICARE

## 2023-06-14 VITALS
BODY MASS INDEX: 24.49 KG/M2 | SYSTOLIC BLOOD PRESSURE: 169 MMHG | HEIGHT: 63 IN | DIASTOLIC BLOOD PRESSURE: 65 MMHG | HEART RATE: 64 BPM | WEIGHT: 138.2 LBS

## 2023-06-14 DIAGNOSIS — S22.080D CLOSED WEDGE COMPRESSION FRACTURE OF T11 VERTEBRA WITH ROUTINE HEALING, SUBSEQUENT ENCOUNTER: Primary | ICD-10-CM

## 2023-06-14 NOTE — PROGRESS NOTES
Chief Complaint  No chief complaint on file.    Subjective          Geovanna Whitten who is a 88 y.o. year old female who presents to [facility] for follow up of T11 compression fracture.     History of Present Illness  Pt is from a nursing facility. She is a poor historian. Daughter and nursing facility are present and provide the majority of the HPI today.     Pt has been wearing her TLSO brace when she is out of bed. Working with physical therapy and tolerating this. She is ambulating short distance with a walker. She is primarily wheelchair bound. She did have a fall around a week ago, sliding out of bed and landing on her buttocks. She has not had increased pain since the fall.     She rates her pain today a 5/10 today.     She had a follow up XR at the facility though we do not have the records of this.     Interval History 4/26/23     Geovanna Whitten who is a 88 y.o. year old female who presents to [facility] for evaluation of T11 compression fx.     Pt is from a nursing facility. She is a poor historian. Daughter and nursing facility are present and provide the majority of the HPI today.      The patient complains of pain located in the thoracic  spine. The pain came on acutely. Pt reported to her daughter that she fell at the nursing home shortly after Easter. The fall was unwitnessed. She started having pain in the right ribs. Then progressed to the back. She had an XR of the spine, demonstrating a moderate T11 compression fracture. The pain scale level is 10.  The pain does radiate. Dermatomes are located on right Thoracic at: a non-specific dermatome...  The pain is constant and described as aching.  The pain is worse at no particular time of day. Patient states nothing worsens the pain. Patient states rest and pain medication makes the pain better.     Associated Symptoms Include: Denies numbness and  "tingling  Conservative Interventions Include: Pain Medications that were somewhat effective.     Was this the result of an injury or accident?: Yes, Fall     History of Previous Spinal Surgery?: No     Nicotine use:  non-smoker     BMI: Body mass index is 24.19 kg/m².     Recent Interventions: TLSO brace, PT      Review of Systems   Musculoskeletal:  Positive for arthralgias and back pain.   All other systems reviewed and are negative.     Objective   Vital Signs:   /65   Pulse 64   Ht 158.8 cm (62.5\")   Wt 62.7 kg (138 lb 3.2 oz)   BMI 24.87 kg/m²       Physical Exam  Vitals reviewed.   Constitutional:       Appearance: Normal appearance.   Neurological:      Mental Status: She is alert and oriented to person, place, and time.      Motor: Motor strength is normal.      Neurologic Exam     Mental Status   Oriented to person, place, and time.   Level of consciousness: alert    Motor Exam   Muscle bulk: normal  Overall muscle tone: normal    Strength   Strength 5/5 throughout.     Gait, Coordination, and Reflexes Wheelchair bound      Result Review :                 Assessment and Plan    Diagnoses and all orders for this visit:    1. Closed wedge compression fracture of T11 vertebra with routine healing, subsequent encounter (Primary)  -     XR Spine Thoracic 2 View; Future    Pt's back pain is improving. Will continue TLSO brace. Continue physical therapy. Repeat XR T Spine in one month. Follow up in 6 weeks and if doing well will plan to wean off brace.       Follow Up   Return in about 6 weeks (around 7/26/2023).  Patient was given instructions and counseling regarding her condition or for health maintenance advice.     -XR Thoracic Spine one month  -TLSO brace when out of bed, off at night  -Follow up in 6 weeks    "

## 2023-07-10 PROBLEM — S32.9XXA PELVIS FRACTURE: Status: ACTIVE | Noted: 2023-07-10

## 2023-07-24 ENCOUNTER — TELEPHONE (OUTPATIENT)
Dept: NEUROSURGERY | Facility: CLINIC | Age: 88
End: 2023-07-24
Payer: MEDICARE

## 2023-07-24 NOTE — TELEPHONE ENCOUNTER
I spoke to Pat, at [facility] to remind them there was an XR T ordered to  be done prior to Mrs Whitten's appt on 7/26. Informed Pat that we will need the report and disc also.

## 2023-07-26 ENCOUNTER — OFFICE VISIT (OUTPATIENT)
Dept: NEUROSURGERY | Facility: CLINIC | Age: 88
End: 2023-07-26
Payer: MEDICARE

## 2023-07-26 VITALS
HEART RATE: 66 BPM | DIASTOLIC BLOOD PRESSURE: 56 MMHG | HEIGHT: 63 IN | SYSTOLIC BLOOD PRESSURE: 125 MMHG | BODY MASS INDEX: 24.63 KG/M2
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
## 2023-07-26 DIAGNOSIS — S22.080D CLOSED WEDGE COMPRESSION FRACTURE OF T11 VERTEBRA WITH ROUTINE HEALING, SUBSEQUENT ENCOUNTER: Primary | ICD-10-CM

## 2023-07-26 PROCEDURE — 1159F MED LIST DOCD IN RCRD: CPT | Performed by: NURSE PRACTITIONER

## 2023-07-26 PROCEDURE — 99213 OFFICE O/P EST LOW 20 MIN: CPT | Performed by: NURSE PRACTITIONER

## 2023-07-26 PROCEDURE — 1160F RVW MEDS BY RX/DR IN RCRD: CPT | Performed by: NURSE PRACTITIONER

## 2023-07-26 NOTE — PROGRESS NOTES
Chief Complaint  Back Pain    Subjective          Geovanna Whitten who is a 88 y.o. year old female who presents to [facility] NEUR[facility]URGERY for follow up of T11 compression fracture.     History of Present Illness  Pt recently in ED on 7/10/23 following another fall with subsequent pubic rami fracture. She is scheduled to follow up with Orthopedics regarding this.     She presents today with only a caregiver from the nursing facility. She is a poor historian and is unable to provide any history. She has no medication list with her. No updated imaging of the spine.     She is wearing her TLSO brace.    She is holding her left thigh, reporting pain in the thigh to the knee.           Interval History 6/14/23    Geovanna Whitten who is a 88 y.o. year old female who presents to [facility] NEUR[facility]URGERY for follow up of T11 compression fracture.      History of Present Illness  Pt is from a nursing facility. She is a poor historian. Daughter and nursing facility are present and provide the majority of the HPI today.      Pt has been wearing her TLSO brace when she is out of bed. Working with physical therapy and tolerating this. She is ambulating short distance with a walker. She is primarily wheelchair bound. She did have a fall around a week ago, sliding out of bed and landing on her buttocks. She has not had increased pain since the fall.      She rates her pain today a 5/10 today.      She had a follow up XR at the facility though we do not have the records of this.      Interval History 4/26/23     Geovanna Whitten who is a 88 y.o. year old female who presents to [facility] NEUR[facility]URGERY for evaluation of T11 compression fx.     Pt is from a nursing facility. She is a poor historian. Daughter and nursing facility are present and provide the majority of the HPI today.      The patient complains of pain located in  "the thoracic  spine. The pain came on acutely. Pt reported to her daughter that she fell at the nursing home shortly after Easter. The fall was unwitnessed. She started having pain in the right ribs. Then progressed to the back. She had an XR of the spine, demonstrating a moderate T11 compression fracture. The pain scale level is 10.  The pain does radiate. Dermatomes are located on right Thoracic at: a non-specific dermatome...  The pain is constant and described as aching.  The pain is worse at no particular time of day. Patient states nothing worsens the pain. Patient states rest and pain medication makes the pain better.     Associated Symptoms Include: Denies numbness and tingling  Conservative Interventions Include: Pain Medications that were somewhat effective.     Was this the result of an injury or accident?: Yes, Fall     History of Previous Spinal Surgery?: No     Nicotine use:  non-smoker     BMI: Body mass index is 24.19 kg/m².     Recent Interventions: TLSO brace, PT        Review of Systems   Musculoskeletal:  Positive for arthralgias, back pain, gait problem and myalgias.   Neurological:  Positive for memory problem.   All other systems reviewed and are negative.     Objective   Vital Signs:   /56 (BP Location: Left arm, Patient Position: Sitting)   Pulse 66   Ht 158.8 cm (62.52\")   BMI 24.63 kg/m²       Physical Exam  Vitals reviewed.   Constitutional:       Appearance: Normal appearance.   Musculoskeletal:      Comments: Pain in the left femur   Neurological:      Mental Status: She is alert.      Neurologic Exam     Mental Status   Oriented to person.   Disoriented to place.   Level of consciousness: alert    Motor Exam Deferred due to pain     Sensory Exam   Light touch normal.     Gait, Coordination, and Reflexes Wheelchair      Result Review :                 Assessment and Plan    Diagnoses and all orders for this visit:    1. Closed wedge compression fracture of T11 vertebra with " routine healing, subsequent encounter (Primary)  -     XR Spine Thoracic 2 View; Future    2. Fall, subsequent encounter  -     XR Spine Thoracic 2 View; Future    Pt has had subsequent falls with new pelvic fracture. We called the facility and XR Thoracic spine was never obtained. Will order XR T Spine to evaluate her T11 fracture and rule out new fractures of the spine. Continue TLSO brace until her next follow up in 6 weeks.       Follow Up   Return in about 6 weeks (around 9/6/2023).  Patient was given instructions and counseling regarding her condition or for health maintenance advice.     -XR Thoracic Spine  -Continue TLSO brace  -follow up in 6 weeks     unable to sign due to cross contamination per md

## 2023-08-04 ENCOUNTER — OFFICE VISIT (OUTPATIENT)
Dept: ORTHOPEDIC SURGERY | Facility: CLINIC | Age: 88
End: 2023-08-04
Payer: MEDICARE

## 2023-08-04 VITALS — BODY MASS INDEX: 25.03 KG/M2 | WEIGHT: 136 LBS | HEIGHT: 62 IN

## 2023-08-04 DIAGNOSIS — S32.9XXS CLOSED NONDISPLACED FRACTURE OF PELVIS, UNSPECIFIED PART OF PELVIS, SEQUELA: Primary | ICD-10-CM

## 2023-08-17 ENCOUNTER — TELEPHONE (OUTPATIENT)
Dept: NEUROSURGERY | Facility: CLINIC | Age: 88
End: 2023-08-17

## 2023-08-17 ENCOUNTER — TELEPHONE (OUTPATIENT)
Dept: ORTHOPEDIC SURGERY | Facility: CLINIC | Age: 88
End: 2023-08-17
Payer: MEDICARE

## 2023-08-17 NOTE — TELEPHONE ENCOUNTER
Caller: [facility]    Relationship to patient: Nursing Home    Best call back number: [phone]    Chief complaint: COMPRESSION FRACTURE    Type of visit: FOLLOW UP    Requested date: SOONER THAN CURRENT SCHEDULED APPT     If rescheduling, when is the original appointment: 9/6/23     Additional notes: NURSE PRACTITIONER AT FACILITY ([facility]) WOULD LIKE PATIENT TO BE SEEN SOONER THAN SCHEDULED APPT, AS SHE HAD A FALL ON 8/11/23 (WITH XRAYS AFTER) AND THEY ARE WORRIED SHE HAS ANOTHER FRACTURE. PLEASE RETURN THEIR CALL AND ASK FOR PAU.

## 2023-08-17 NOTE — TELEPHONE ENCOUNTER
Caller: JOÃO RILEY/ [facility]    Best call back number: [phone]     What form or medical record are you requesting: OFFICE NOTE 08/04/23    Who is requesting this form or medical record from you: [facility]    How would you like to receive the form or medical records (pick-up, mail, fax): FAX  If fax, what is the fax number: [phone]    Timeframe paperwork needed: ASAP

## 2023-08-21 NOTE — TELEPHONE ENCOUNTER
Called and spoke to Pat, to remind them to bring disc and report to patients appointment. Pat said that she will pass along to DON to assure it gets done.

## 2023-08-24 ENCOUNTER — OFFICE VISIT (OUTPATIENT)
Dept: NEUROSURGERY | Facility: CLINIC | Age: 88
End: 2023-08-24
Payer: MEDICARE

## 2023-08-24 VITALS
WEIGHT: 135 LBS | HEART RATE: 83 BPM | SYSTOLIC BLOOD PRESSURE: 161 MMHG | HEIGHT: 62 IN | BODY MASS INDEX: 24.84 KG/M2 | DIASTOLIC BLOOD PRESSURE: 75 MMHG

## 2023-08-24 DIAGNOSIS — M80.88XG OSTEOPOROTIC COMPRESSION FRACTURE OF VERTEBRA WITH DELAYED HEALING: ICD-10-CM
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
## 2023-08-24 DIAGNOSIS — S22.080G CLOSED WEDGE COMPRESSION FRACTURE OF T11 VERTEBRA WITH DELAYED HEALING, SUBSEQUENT ENCOUNTER: ICD-10-CM

## 2023-08-24 DIAGNOSIS — S22.070A CLOSED WEDGE COMPRESSION FRACTURE OF T9 VERTEBRA, INITIAL ENCOUNTER: ICD-10-CM

## 2023-08-24 NOTE — PATIENT INSTRUCTIONS
-TLSO brace  -CT Thoracic Spine  -Bone density study  -Physical therapy as tolerated  -Follow up in 6 weeks

## 2023-08-24 NOTE — PROGRESS NOTES
Chief Complaint  Follow-up    Subjective          Geovanna Whitten who is a 88 y.o. year old female who presents to [facility] NEUR[facility]URGERY for follow up of T11 compression fracture.     History of Present Illness  She presents today with only a caregiver from the nursing facility. She is a poor historian and is unable to provide any history. She has no medication list with her. No updated imaging of the spine.     Pt does not appear to be in significant pain today. She denies pain in her back. She is wearing her TLSO brace.     She is being followed by Dr. Montemayor for pubic rami fracture. She had follow up with him on 8/4/23 and was given instructions for WBAT.    I have been able to talk to the NP taking care of her at [facility], ROSCOE Kennedy. She reports that the patient fell on 8/11/23.     She has been working with physical therapy and was having concerns of severe pain. She had a stat XR which the NP reports noted worsening T11 compression deformity and new T9 compression deformity. Report to be faxed to our facility.     NP is unsure of patient's last bone density study.     Interval History   Geovanna Whitten who is a 88 y.o. year old female who presents to [facility] NEUR[facility]URGERY for follow up of T11 compression fracture.      History of Present Illness  Pt recently in ED on 7/10/23 following another fall with subsequent pubic rami fracture. She is scheduled to follow up with Orthopedics regarding this.      She presents today with only a caregiver from the nursing facility. She is a poor historian and is unable to provide any history. She has no medication list with her. No updated imaging of the spine.      She is wearing her TLSO brace.     She is holding her left thigh, reporting pain in the thigh to the knee.      Recent Interventions: TLSO brace, PT      Review of Systems   Musculoskeletal:  Positive for arthralgias, back pain  "and gait problem.   Neurological:  Positive for memory problem.   All other systems reviewed and are negative.     Objective   Vital Signs:   /75 (BP Location: Right arm, Patient Position: Sitting, Cuff Size: Adult)   Pulse 83   Ht 157.5 cm (62\")   Wt 61.2 kg (135 lb)   BMI 24.69 kg/mý       Physical Exam  Vitals reviewed.   Constitutional:       Appearance: Normal appearance.   Neurological:      Mental Status: She is alert.      Motor: Motor strength is normal.      Neurologic Exam     Mental Status   Oriented to person.   Disoriented to place.   Level of consciousness: alert    Motor Exam   Muscle bulk: normal  Overall muscle tone: normal    Strength   Strength 5/5 throughout.     Sensory Exam   Light touch normal.     Gait, Coordination, and Reflexes Wheelchair      Result Review :            Bone Density in 2021 demonstrates osteopenia in femur and spine.     Assessment and Plan    Diagnoses and all orders for this visit:    1. Fall, subsequent encounter (Primary)  -     CT Thoracic Spine Without Contrast; Future  -     DEXA Bone Density Axial; Future    2. Closed wedge compression fracture of T9 vertebra, initial encounter  -     CT Thoracic Spine Without Contrast; Future  -     DEXA Bone Density Axial; Future    3. Closed wedge compression fracture of T11 vertebra with delayed healing, subsequent encounter  -     CT Thoracic Spine Without Contrast; Future  -     DEXA Bone Density Axial; Future    4. Osteoporotic compression fracture of vertebra with delayed healing  -     CT Thoracic Spine Without Contrast; Future  -     DEXA Bone Density Axial; Future    Pt with Alzheimer's dementia and history of T11 compression fracture following a fall. She has suffered several falls since this initial injury, resulting in pelvic fracture and now a new fracture at T9. Her last bone density study was in 2021, demonstration osteopenia. Will order DEXA scan for further management of osteoporosis.     Continue TLSO " brace. Order CT Thoracic Spine to evaluate her new and progressing thoracic spine fractures.     She will follow up in 6 weeks.     I spent 42 minutes caring for Geovanna on this date of service. This time includes time spent by me in the following activities:preparing for the visit, reviewing tests, obtaining and/or reviewing a separately obtained history, performing a medically appropriate examination and/or evaluation , counseling and educating the patient/family/caregiver, ordering medications, tests, or procedures, documenting information in the medical record, independently interpreting results and communicating that information with the patient/family/caregiver, and care coordination.    Follow Up   No follow-ups on file.  Patient was given instructions and counseling regarding her condition or for health maintenance advice.       -TLSO brace  -CT Thoracic Spine  -Bone density study  -Physical therapy as tolerated  -Follow up in 6 weeks

## 2023-09-08 ENCOUNTER — OFFICE VISIT (OUTPATIENT)
Dept: ORTHOPEDIC SURGERY | Facility: CLINIC | Age: 88
End: 2023-09-08
Payer: MEDICARE

## 2023-09-08 VITALS — HEIGHT: 62 IN | WEIGHT: 135 LBS | BODY MASS INDEX: 24.84 KG/M2

## 2023-09-08 DIAGNOSIS — S32.9XXS CLOSED NONDISPLACED FRACTURE OF PELVIS, UNSPECIFIED PART OF PELVIS, SEQUELA: Primary | ICD-10-CM

## 2023-09-08 NOTE — PATIENT INSTRUCTIONS
X-rays reviewed. Patient is tolerating progression of weightbearing. Encourage continued participation in PT to tolerance. Follow up in 6 weeks. Repeat x-rays to be sent with patient.

## 2023-09-08 NOTE — PROGRESS NOTES
"Chief Complaint  Pain and Follow-up of the Pelvis    Subjective      Geovanna Whitten presents to [facility] for follow up of comminuted and displaced fractures of the left inferior pubic ramus and a comminuted mildly displaced fracture involving the left superior pubic ramus, extending to the left anterior acetabulum.  Patient has been managed conservatively/nonoperatively with activity modification.  Patient has been weightbearing as tolerated.  She presents today in a wheelchair and is currently residing at [facility] in [place].  Reports that she is working with PT at least weekly.  Reports her pain continues to improve.    Objective   Allergies   Allergen Reactions    Iodine     Nsaids Unknown - High Severity    Sulfa Antibiotics Unknown - Low Severity       Vital Signs:   Ht 157.5 cm (62\")   Wt 61.2 kg (135 lb)   BMI 24.69 kg/m²       Physical Exam    Constitutional: Awake, alert. Well nourished appearance.    Integumentary: Warm, dry, intact. No obvious rashes.    HENT: Atraumatic, normocephalic.   Respiratory: Non labored respirations .   Cardiovascular: Intact peripheral pulses.    Psychiatric: Normal mood and affect. A&O X3    Ortho Exam  Left hip: Skin is warm, dry, and intact.  Pelvis is stable to rock.  No pain with passive internal or external rotation of the hip.  Pain with straight leg raise.  Poor strength to hip flexors, extensors, abductors, and adductors.  Full flexion extension of the knee.  Full plantarflexion and dorsiflexion of the ankle.  Sensation intact light touch.  Distal neurovascular intact.  Gait not assessed as patient presents in wheelchair.    Imaging:  X-rays on disc reviewed in office, showing well healing fractures of the left inferior and superior pubic rami. Personally reviewed.               Assessment and Plan   Problem List Items Addressed This Visit          Musculoskeletal and Injuries    Pelvis fracture - Primary    " Relevant Orders    Ambulatory Referral to Physical Therapy Evaluate and treat; Stretching, ROM, Strengthening (Completed)       Follow Up   No follow-ups on file.    Tobacco Use: Low Risk     Smoking Tobacco Use: Never    Smokeless Tobacco Use: Never    Passive Exposure: Not on file     Patient is a non-smoker.  Did not discuss tobacco cessation options.    Patient Instructions   X-rays reviewed. Patient is tolerating progression of weightbearing. Encourage continued participation in PT to tolerance. Follow up in 6 weeks. Repeat x-rays to be sent with patient.   Patient was given instructions and counseling regarding her condition or for health maintenance advice. Please see specific information pulled into the AVS if appropriate.

## 2023-09-20 ENCOUNTER — HOSPITAL ENCOUNTER (OUTPATIENT)
Dept: CT IMAGING | Facility: HOSPITAL | Age: 88
Discharge: HOME | End: 2023-09-20
Payer: MEDICARE

## 2023-09-20 ENCOUNTER — HOSPITAL ENCOUNTER (OUTPATIENT)
Dept: BONE DENSITY | Facility: HOSPITAL | Age: 88
Discharge: HOME | End: 2023-09-20
Payer: MEDICARE
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
## 2023-09-20 DIAGNOSIS — S22.080G CLOSED WEDGE COMPRESSION FRACTURE OF T11 VERTEBRA WITH DELAYED HEALING, SUBSEQUENT ENCOUNTER: ICD-10-CM

## 2023-09-20 DIAGNOSIS — M80.88XG OSTEOPOROTIC COMPRESSION FRACTURE OF VERTEBRA WITH DELAYED HEALING: ICD-10-CM

## 2023-09-20 DIAGNOSIS — S22.070A CLOSED WEDGE COMPRESSION FRACTURE OF T9 VERTEBRA, INITIAL ENCOUNTER: ICD-10-CM

## 2023-09-20 PROCEDURE — 77080 DXA BONE DENSITY AXIAL: CPT

## 2023-09-20 PROCEDURE — 72128 CT CHEST SPINE W/O DYE: CPT

## 2023-09-27 NOTE — TELEPHONE ENCOUNTER
----- Message from ROSCOE March sent at 7/23/2021  2:06 PM EDT -----  Appears normal will await culture  
Per ROE, CHINMAY @ 3396  
Detail Level: Detailed
Size Of Lesion In Cm (Optional): 0
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
Morphology Per Location (Optional): 2015 Excision
Morphology Per Location (Optional): Treated EXC on 04/27/2022 by Dr. Smith

## 2023-10-20 ENCOUNTER — OFFICE VISIT (OUTPATIENT)
Dept: ORTHOPEDIC SURGERY | Facility: CLINIC | Age: 88
End: 2023-10-20
Payer: MEDICARE

## 2023-10-20 VITALS
OXYGEN SATURATION: 95 % | SYSTOLIC BLOOD PRESSURE: 149 MMHG | BODY MASS INDEX: 24.69 KG/M2 | HEART RATE: 98 BPM | HEIGHT: 62 IN | DIASTOLIC BLOOD PRESSURE: 75 MMHG

## 2023-10-20 DIAGNOSIS — S32.9XXS CLOSED NONDISPLACED FRACTURE OF PELVIS, UNSPECIFIED PART OF PELVIS, SEQUELA: Primary | ICD-10-CM

## 2023-10-20 RX ORDER — SENNOSIDES A AND B 8.6 MG/1
1 TABLET, FILM COATED ORAL DAILY
COMMUNITY

## 2023-10-20 RX ORDER — TIZANIDINE 2 MG/1
TABLET ORAL
COMMUNITY
Start: 2023-10-02

## 2023-10-20 NOTE — PATIENT INSTRUCTIONS
X-rays sent with patient reviewed showing excellent healing of fracture. Unfortunately, patient has had recurrent fall with T9, T11, and sacrum fractures, which are being treated. She is currently in [place] brace. Advised continued participation in therapy to tolerance. Fall precautions needed.     Follow up as needed. Call with changes or concerns.

## 2023-10-20 NOTE — PROGRESS NOTES
"Chief Complaint  Follow-up of the Pelvis    Subjective      Geovanna Whitten presents to [facility] for follow-up of comminuted and displaced fractures of the left inferior pubic ramus and comminuted mildly displaced fracture involving the left superior pubic ramus with extension into the left anterior acetabulum.  Patient has been managed conservatively/nonoperatively with activity modification and analgesics as needed.  She presents today in wheelchair and is in the presence of [facility]. Patient is unable to assist with this history.  Caregiver does report that the patient has had a fall in the interim since her last office visit with findings of T9, T11, and sacral fracture and patient is in [place] brace due to this.  He has been working with physical therapy at least 6 days/week.    Objective   Allergies   Allergen Reactions    Iodine     Nsaids Unknown - High Severity    Sulfa Antibiotics Unknown - Low Severity       Vital Signs:   /75   Pulse 98   Ht 157.5 cm (62\")   SpO2 95%   BMI 24.69 kg/m²       Physical Exam    Constitutional: Awake, alert. Well nourished appearance.    Integumentary: Warm, dry, intact. No obvious rashes.    HENT: Atraumatic, normocephalic.   Respiratory: Non labored respirations .   Cardiovascular: Intact peripheral pulses.    Psychiatric: Normal mood and affect. A&O X3    Ortho Exam  Left hip: Exam limited by patient participation.  This is stable to rock.  No obvious pain elicited with passive internal or external rotation of the hip.  Poor strength to hip flexors, extensors, abductors, and adductors.  Crepitus with knee ROM.  Distal neurovascular intact.  Gait not assessed as patient presents in wheelchair.    Imaging:   X-rays reviewed by myself [facility] website.    X-ray of the left hip   Conclusion: Mild left hip arthrosis.  No obvious or acutely displaced hip fracture.  Healing/near " completely healed left superior and inferior pubic ramus fractures.  Further evaluation with repeat hip radiographs or a hip MRI would be warranted for persistent clinical concern, worsening symptoms, or inability to ambulate. Jaime Fermin DO 10/20/23    Lumbar Spine X-ray  Conclusion: Age-indeterminate T9 and T11 vertebral body compression deformities.  Correlate clinically for focal pain to assess for acuity and consider further evaluation with an MRI if the clinical exam is equivocal.  Cortical buckling involving the superior ventral sacrum concerning for fracture.  Consider CT. Jaime Fermin DO 10/20/23             Assessment and Plan   Problem List Items Addressed This Visit          Musculoskeletal and Injuries    Pelvis fracture - Primary     Follow Up   No follow-ups on file.    Tobacco Use: Low Risk  (10/20/2023)    Patient History     Smoking Tobacco Use: Never     Smokeless Tobacco Use: Never     Passive Exposure: Not on file     Patient is a non-smoker.  Did not discuss tobacco cessation options.    Patient Instructions   X-rays sent with patient reviewed showing excellent healing of fracture. Unfortunately, patient has had recurrent fall with T9, T11, and sacrum fractures, which are being treated. She is currently in [place] brace. Advised continued participation in therapy to tolerance. Fall precautions needed.     Follow up as needed. Call with changes or concerns.   Patient was given instructions and counseling regarding her condition or for health maintenance advice. Please see specific information pulled into the AVS if appropriate.

## 2023-10-26 ENCOUNTER — OFFICE VISIT (OUTPATIENT)
Dept: NEUROSURGERY | Facility: CLINIC | Age: 88
End: 2023-10-26
Payer: MEDICARE

## 2023-10-26 VITALS
SYSTOLIC BLOOD PRESSURE: 140 MMHG | HEART RATE: 67 BPM | BODY MASS INDEX: 21.55 KG/M2 | DIASTOLIC BLOOD PRESSURE: 57 MMHG | WEIGHT: 117.1 LBS | HEIGHT: 62 IN

## 2023-10-26 DIAGNOSIS — M48.04 SPINAL STENOSIS, THORACIC: Primary | ICD-10-CM

## 2023-10-26 DIAGNOSIS — S22.080D COMPRESSION FRACTURE OF T11 VERTEBRA WITH ROUTINE HEALING, SUBSEQUENT ENCOUNTER: ICD-10-CM

## 2023-10-26 DIAGNOSIS — S22.070D CLOSED WEDGE COMPRESSION FRACTURE OF T9 VERTEBRA WITH ROUTINE HEALING, SUBSEQUENT ENCOUNTER: ICD-10-CM

## 2023-10-26 DIAGNOSIS — M85.89 OSTEOPENIA OF MULTIPLE SITES: ICD-10-CM

## 2023-10-26 DIAGNOSIS — Z91.81 AT HIGH RISK FOR FALLS: ICD-10-CM

## 2023-10-26 NOTE — PATIENT INSTRUCTIONS
-Pt to continue wearing TLSO brace when out of bed and ambulatory. Off when resting in bed and sleeping.   -Follow up as needed

## 2023-10-26 NOTE — PROGRESS NOTES
Chief Complaint  Follow-up (Discuss DEXA, CT)    Subjective          Geovanna Whitten who is a 88 y.o. year old female who presents to [facility] for follow up. CT Thoracic Spine and Dexa Scan.     History of Present Illness  Pt with history dementia and is a poor historian. She is assisted by a caregiver today. No family present. The patient and caregiver are unable to provide any history.     CT Thoracic Spine on 9/20/23 personally reviewed. Subacute healing compression fractures of T9 and T11. Severe canal stenosis at T11 secondary to marked retropulsion of the posterior cortex into the spinal canal.    DEXA scan demonstrates osteopenia in the femur and lumbar spine.     Pt continues to wear her TLSO brace.     Her mobility is improving.       Interval History 8/24/23    Geovanna Whitten who is a 88 y.o. year old female who presents to [facility] for follow up of T11 compression fracture.      History of Present Illness  She presents today with only a caregiver from the nursing facility. She is a poor historian and is unable to provide any history. She has no medication list with her. No updated imaging of the spine.      Pt does not appear to be in significant pain today. She denies pain in her back. She is wearing her TLSO brace.      She is being followed by Dr. Montemayor for pubic rami fracture. She had follow up with him on 8/4/23 and was given instructions for WBAT.     I have been able to talk to the NP taking care of her at [facility], ROSCOE Kennedy. She reports that the patient fell on 8/11/23.      She has been working with physical therapy and was having concerns of severe pain. She had a stat XR which the NP reports noted worsening T11 compression deformity and new T9 compression deformity. Report to be faxed to our facility.      NP is unsure of patient's last bone density study.     Recent  "Interventions: TLSO brace      Review of Systems   Musculoskeletal:  Positive for arthralgias, back pain and gait problem.   Neurological:  Positive for memory problem.   All other systems reviewed and are negative.       Objective   Vital Signs:   /57 (BP Location: Left arm, Patient Position: Sitting, Cuff Size: Adult)   Pulse 67   Ht 157.5 cm (62\")   Wt 53.1 kg (117 lb 1.6 oz)   BMI 21.42 kg/m²       Physical Exam  Vitals reviewed.   Constitutional:       Appearance: Normal appearance.   Neurological:      Mental Status: She is alert.      Motor: Motor strength is normal.       Neurologic Exam     Mental Status   Oriented to person.   Disoriented to place.   Level of consciousness: alert    Motor Exam   Muscle bulk: normal  Overall muscle tone: normal    Strength   Strength 5/5 throughout.     Sensory Exam   Light touch normal.     Gait, Coordination, and Reflexes Wheelchair        Result Review :       Data reviewed : Radiologic studies CT Thoracic Spine on 9/20/23 personally reviewed. Subacute healing compression fractures of T9 and T11. Severe canal stenosis at T11 secondary to marked retropulsion of the posterior cortex into the spinal canal.         DEXA Scan on 9/20/23 demonstrates osteopenia.       Assessment and Plan    Diagnoses and all orders for this visit:    1. Spinal stenosis, thoracic (Primary)    2. Compression fracture of T11 vertebra with routine healing, subsequent encounter    3. Closed wedge compression fracture of T9 vertebra with routine healing, subsequent encounter    4. Osteopenia of multiple sites    5. At high risk for falls    We reviewed her CT Thoracic Spine and Dexa Scan. She has osteopenia. Her fractures do appear to be healing. I am concerned due to the malalignment of spine with significant retropulsion at T11 contributing to spinal stenosis. She is high risk for falls. If she were to fall she is at high risk for paralysis. Would recommend continued use of the TLSO " brace when out of bed and ambulating. Due to her age, fracture risk, and falls risk I would recommend this to be use permanently as tolerated.     She can continue physical therapy for strengthening.     Follow up as needed.       Follow Up   Return if symptoms worsen or fail to improve.  Patient was given instructions and counseling regarding her condition or for health maintenance advice.

## 2024-03-09 ENCOUNTER — APPOINTMENT (OUTPATIENT)
Dept: CT IMAGING | Facility: HOSPITAL | Age: 89
End: 2024-03-09
Payer: MEDICARE

## 2024-03-09 ENCOUNTER — HOSPITAL ENCOUNTER (EMERGENCY)
Facility: HOSPITAL | Age: 89
Discharge: HOME | End: 2024-03-09
Attending: EMERGENCY MEDICINE
Payer: MEDICARE

## 2024-03-09 ENCOUNTER — APPOINTMENT (OUTPATIENT)
Dept: GENERAL RADIOLOGY | Facility: HOSPITAL | Age: 89
End: 2024-03-09
Payer: MEDICARE

## 2024-03-09 VITALS
DIASTOLIC BLOOD PRESSURE: 70 MMHG | OXYGEN SATURATION: 97 % | WEIGHT: 117.73 LBS | HEIGHT: 62 IN | BODY MASS INDEX: 21.66 KG/M2 | RESPIRATION RATE: 22 BRPM | HEART RATE: 65 BPM | SYSTOLIC BLOOD PRESSURE: 177 MMHG | TEMPERATURE: 97.6 F
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
## 2024-03-09 DIAGNOSIS — N39.0 URINARY TRACT INFECTION WITHOUT HEMATURIA, SITE UNSPECIFIED: ICD-10-CM

## 2024-03-09 LAB
ALBUMIN SERPL-MCNC: 3.6 G/DL (ref 3.5–5.2)
ALBUMIN/GLOB SERPL: 1.1 G/DL
ALP SERPL-CCNC: 42 U/L (ref 39–117)
ALT SERPL W P-5'-P-CCNC: 11 U/L (ref 1–33)
ANION GAP SERPL CALCULATED.3IONS-SCNC: 13.1 MMOL/L (ref 5–15)
AST SERPL-CCNC: 15 U/L (ref 1–32)
BACTERIA UR QL AUTO: ABNORMAL /HPF
BASOPHILS # BLD AUTO: 0.08 10*3/MM3 (ref 0–0.2)
BASOPHILS NFR BLD AUTO: 0.8 % (ref 0–1.5)
BILIRUB SERPL-MCNC: 0.3 MG/DL (ref 0–1.2)
BILIRUB UR QL STRIP: NEGATIVE
BUN SERPL-MCNC: 14 MG/DL (ref 8–23)
BUN/CREAT SERPL: 18.4 (ref 7–25)
CALCIUM SPEC-SCNC: 8.9 MG/DL (ref 8.6–10.5)
CHLORIDE SERPL-SCNC: 102 MMOL/L (ref 98–107)
CLARITY UR: ABNORMAL
CO2 SERPL-SCNC: 24.9 MMOL/L (ref 22–29)
COLOR UR: YELLOW
CREAT SERPL-MCNC: 0.76 MG/DL (ref 0.57–1)
DEPRECATED RDW RBC AUTO: 42.5 FL (ref 37–54)
EGFRCR SERPLBLD CKD-EPI 2021: 76.4 ML/MIN/1.73
EOSINOPHIL # BLD AUTO: 0.08 10*3/MM3 (ref 0–0.4)
EOSINOPHIL NFR BLD AUTO: 0.8 % (ref 0.3–6.2)
ERYTHROCYTE [DISTWIDTH] IN BLOOD BY AUTOMATED COUNT: 13.1 % (ref 12.3–15.4)
GLOBULIN UR ELPH-MCNC: 3.2 GM/DL
GLUCOSE SERPL-MCNC: 95 MG/DL (ref 65–99)
GLUCOSE UR STRIP-MCNC: NEGATIVE MG/DL
HCT VFR BLD AUTO: 41.8 % (ref 34–46.6)
HGB BLD-MCNC: 13.7 G/DL (ref 12–15.9)
HGB UR QL STRIP.AUTO: NEGATIVE
HYALINE CASTS UR QL AUTO: ABNORMAL /LPF
IMM GRANULOCYTES # BLD AUTO: 0.03 10*3/MM3 (ref 0–0.05)
IMM GRANULOCYTES NFR BLD AUTO: 0.3 % (ref 0–0.5)
KETONES UR QL STRIP: NEGATIVE
LEUKOCYTE ESTERASE UR QL STRIP.AUTO: ABNORMAL
LYMPHOCYTES # BLD AUTO: 2.53 10*3/MM3 (ref 0.7–3.1)
LYMPHOCYTES NFR BLD AUTO: 25.9 % (ref 19.6–45.3)
MCH RBC QN AUTO: 29.1 PG (ref 26.6–33)
MCHC RBC AUTO-ENTMCNC: 32.8 G/DL (ref 31.5–35.7)
MCV RBC AUTO: 88.9 FL (ref 79–97)
MONOCYTES # BLD AUTO: 0.63 10*3/MM3 (ref 0.1–0.9)
MONOCYTES NFR BLD AUTO: 6.5 % (ref 5–12)
NEUTROPHILS NFR BLD AUTO: 6.41 10*3/MM3 (ref 1.7–7)
NEUTROPHILS NFR BLD AUTO: 65.7 % (ref 42.7–76)
NITRITE UR QL STRIP: POSITIVE
NRBC BLD AUTO-RTO: 0 /100 WBC (ref 0–0.2)
PH UR STRIP.AUTO: 7 [PH] (ref 5–8)
PLATELET # BLD AUTO: 467 10*3/MM3 (ref 140–450)
PMV BLD AUTO: 9 FL (ref 6–12)
POTASSIUM SERPL-SCNC: 4.5 MMOL/L (ref 3.5–5.2)
PROT SERPL-MCNC: 6.8 G/DL (ref 6–8.5)
PROT UR QL STRIP: ABNORMAL
RBC # BLD AUTO: 4.7 10*6/MM3 (ref 3.77–5.28)
RBC # UR STRIP: ABNORMAL /HPF
REF LAB TEST METHOD: ABNORMAL
SODIUM SERPL-SCNC: 140 MMOL/L (ref 136–145)
SP GR UR STRIP: 1.02 (ref 1–1.03)
SQUAMOUS #/AREA URNS HPF: ABNORMAL /HPF
UROBILINOGEN UR QL STRIP: ABNORMAL
WBC # UR STRIP: ABNORMAL /HPF
WBC NRBC COR # BLD AUTO: 9.76 10*3/MM3 (ref 3.4–10.8)

## 2024-03-09 PROCEDURE — 87077 CULTURE AEROBIC IDENTIFY: CPT | Performed by: EMERGENCY MEDICINE

## 2024-03-09 PROCEDURE — 85025 COMPLETE CBC W/AUTO DIFF WBC: CPT | Performed by: EMERGENCY MEDICINE

## 2024-03-09 PROCEDURE — 81001 URINALYSIS AUTO W/SCOPE: CPT | Performed by: EMERGENCY MEDICINE

## 2024-03-09 PROCEDURE — 36415 COLL VENOUS BLD VENIPUNCTURE: CPT

## 2024-03-09 PROCEDURE — 87186 SC STD MICRODIL/AGAR DIL: CPT | Performed by: EMERGENCY MEDICINE

## 2024-03-09 PROCEDURE — 87086 URINE CULTURE/COLONY COUNT: CPT | Performed by: EMERGENCY MEDICINE

## 2024-03-09 PROCEDURE — 73521 X-RAY EXAM HIPS BI 2 VIEWS: CPT

## 2024-03-09 PROCEDURE — 99284 EMERGENCY DEPT VISIT MOD MDM: CPT

## 2024-03-09 PROCEDURE — 70450 CT HEAD/BRAIN W/O DYE: CPT

## 2024-03-09 PROCEDURE — 80053 COMPREHEN METABOLIC PANEL: CPT | Performed by: EMERGENCY MEDICINE

## 2024-03-09 RX ORDER — MEMANTINE HYDROCHLORIDE 5 MG/1
TABLET ORAL
COMMUNITY
Start: 2024-02-20

## 2024-03-09 RX ORDER — CEFUROXIME AXETIL 500 MG/1
500 TABLET ORAL 2 TIMES DAILY
Qty: 10 TABLET | Refills: 0 | Status: SHIPPED | OUTPATIENT
Start: 2024-03-09 | End: 2024-03-14

## 2024-03-09 NOTE — ED PROVIDER NOTES
"Time: 9:33 AM EST  Date of encounter:  3/9/2024  Independent Historian/Clinical History and Information was obtained by:   Patient and Nursing Staff    History is limited by: Dementia    Chief Complaint: fall      History of Present Illness:  Patient is a 88 y.o. year old female who presents to the emergency department for evaluation of fall that occurred this morning at her nursing facility. The fall was unwitnessed. Patient is mostly non-verbal and only responds with few \"yes\" or \"no\" answers. Patient has a history of falls with pelvic and spinal fractures. Patient has no visible abrasions, bruises or fractures today. Palpation of head, abdomen, upper and lower extremities elicited no pain. She is not currently on a blood thinner.     HPI    Patient Care Team  Primary Care Provider: Joseph Hopkins MD    Past Medical History:     Allergies   Allergen Reactions    Iodine     Nsaids Unknown - High Severity    Sulfa Antibiotics Unknown - Low Severity     Past Medical History:   Diagnosis Date    Acquired spondylolisthesis 01/23/2020    LUMBAR ARTHRITIS    Arm pain     Bladder disorder     Bladder problem     Carotid artery stenosis     Corn or callus     Degeneration of lumbar intervertebral disc 01/23/2020    Depression     Esophageal reflux     Essential hypertension     Gout     Hearing loss     High cholesterol     Hyperlipidemia     Hypertension     Hypothyroidism     Ingrown toenail     Joint pain     Leg pain     Leg swelling     Limb swelling     Lumbago 01/23/2020    Migraine     Muscle cramps     Numbness in feet     Parkinson's disease     Peripheral vascular disease     PSVT (paroxysmal supraventricular tachycardia)     Radiculopathy of lumbosacral region 01/23/2020    Scoliosis 01/23/2020    Screening mammogram, encounter for 07/02/2020    NORMAL    Thyroid disease     Thyroid disorder     Ulcerative colitis     Vertigo      Past Surgical History:   Procedure Laterality Date    BLADDER SURGERY      " Kapil Baron MD  You 43 minutes ago (3:51 PM)   [address]  Go ahead.      Message text CATARACT EXTRACTION      COLONOSCOPY N/A 10/18/2016    Procedure: COLONOSCOPY TO CECUM WITH COLD BIOPSIES;  Surgeon: James Mckeon MD;  Location: [facility] ENDOSCOPY;  Service:     CYSTOCELE REPAIR      EYE SURGERY      GLAUCOMA SURGERY      HYSTERECTOMY      RECTAL SURGERY       Family History   Problem Relation Age of Onset    Hypertension Mother     Stroke Mother     Arthritis Mother     Coronary artery disease Father     Heart disease Father     Aneurysm Brother     Diabetes Maternal Grandmother        Home Medications:  Prior to Admission medications    Medication Sig Start Date End Date Taking? Authorizing Provider   memantine (NAMENDA) 5 MG tablet  2/20/24  Yes Anne Bautista MD   acetaminophen (TYLENOL) 325 MG tablet Take 1 tablet by mouth Every 4 (Four) Hours As Needed. 2/25/23   Anne Bautista MD   Calcium Carbonate 1500 (600 Ca) MG tablet Take 1 tablet by mouth Daily.    Anne Bautista MD   carbidopa-levodopa (SINEMET)  MG per tablet Take 2 tablets by mouth 3 (Three) Times a Day. 6/18/21   Amador Morton MD   Cholecalciferol (VITAMIN D) 2000 UNITS tablet Take 1 tablet by mouth Daily. 3/9/15   Anne Bautista MD   donepezil (ARICEPT) 5 MG tablet Take 1 tablet by mouth Every Night. 4/7/23   Anne Bautista MD   HYDROcodone-acetaminophen (NORCO) 5-325 MG per tablet Take 1 tablet by mouth Every 6 (Six) Hours As Needed. 4/17/23   Anne Bautista MD   hydrocortisone 1 % cream  4/7/23   Anne Bautista MD   latanoprost (XALATAN) 0.005 % ophthalmic solution Administer 1 drop to both eyes Daily. 4/27/21   Anne Bautista MD   levothyroxine (SYNTHROID, LEVOTHROID) 75 MCG tablet Take 1 tablet by mouth Daily. 1/18/21   Anne Bautista MD   metoprolol succinate XL (TOPROL-XL) 25 MG 24 hr tablet Take 1 tablet by mouth Daily. 3/21/23   Anne Bautista MD Misc. Devices (Foam Chair Cushion) misc bathroom chair with back use for assistance in  "bathroom 4/29/2021  Active 4/29/21   Anne Bautista MD   [facility]. Devices (Walker Glide Wheels) [facility] lightweight walker walk assistance 4/29/2021  Active 4/29/21   Anne Bautista MD   Multiple Vitamin tablet Take 1 tablet by mouth Daily. 3/9/15   Anne Bautista MD   pramipexole (MIRAPEX) 0.5 MG tablet Take 1 tablet by mouth 3 (Three) Times a Day. 1/19/22   GabepillAmador butts MD   senna 8.6 MG tablet Take 1 tablet by mouth Daily.    Anne Bautista MD   tiZANidine (ZANAFLEX) 2 MG tablet  10/2/23   Anne Bautista MD   vitamin B-6 (PYRIDOXINE) 50 MG tablet Take 1 tablet by mouth Daily.    Anne Bautista MD   vitamin E 400 UNIT capsule Take 1 capsule by mouth Daily.    Anne Bautista MD        Social History:   Social History     Tobacco Use    Smoking status: Never    Smokeless tobacco: Never   Vaping Use    Vaping status: Never Used   Substance Use Topics    Alcohol use: Never    Drug use: Never         Review of Systems:  Review of Systems   Unable to perform ROS: Patient nonverbal        Physical Exam:  /70   Pulse 65   Temp 97.6 °F (36.4 °C) (Oral)   Resp 22   Ht 157.5 cm (62\")   Wt 53.4 kg (117 lb 11.6 oz)   SpO2 97%   BMI 21.53 kg/m²     Physical Exam  Constitutional:       General: She is not in acute distress.     Appearance: Normal appearance. She is normal weight.   HENT:      Head: Normocephalic and atraumatic.   Eyes:      Pupils: Pupils are equal, round, and reactive to light.   Cardiovascular:      Rate and Rhythm: Normal rate and regular rhythm.   Pulmonary:      Effort: Pulmonary effort is normal.      Breath sounds: Rhonchi present.   Abdominal:      General: There is no distension.      Palpations: Abdomen is soft.      Tenderness: There is no abdominal tenderness.   Musculoskeletal:         General: No swelling or tenderness. Normal range of motion.      Cervical back: Normal range of motion.   Skin:     Findings: No bruising. "   Neurological:      General: No focal deficit present.   Psychiatric:         Mood and Affect: Mood normal.                  Procedures:  Procedures      Medical Decision Making:      Comorbidities that affect care:    Degeneration of lumbar intervertebral disc, paroxysmal svt, vertigo, parkinson's disease, pelvic fracture    External Notes reviewed:    Previous Clinic Note: Neurosurgery office visit with Kelsea Misaelsimoan ROSCOE on 10/26/2023. Diagnoses: thoracic spinal stenosis, compression fracture of T11 vertebra, Closed wedge compression fracture of T9 vertebra, osteopenia of multiple sites, high risk for falls.        The following orders were placed and all results were independently analyzed by me:  Orders Placed This Encounter   Procedures    Urine Culture - Urine,    CT Head Without Contrast    XR Hips Bilateral With or Without Pelvis 2 View    Comprehensive Metabolic Panel    Urinalysis With Culture If Indicated - Urine, Clean Catch    CBC Auto Differential    Urinalysis, Microscopic Only - Urine, Clean Catch    CBC & Differential       Medications Given in the Emergency Department:  Medications - No data to display     ED Course:         Labs:    Lab Results (last 24 hours)       Procedure Component Value Units Date/Time    CBC & Differential [[id number]]  (Abnormal) Collected: 03/09/24 1002    Specimen: Blood Updated: 03/09/24 1017    Narrative:      The following orders were created for panel order CBC & Differential.  Procedure                               Abnormality         Status                     ---------                               -----------         ------                     CBC Auto Differential[[id number]]        Abnormal            Final result                 Please view results for these tests on the individual orders.    Comprehensive Metabolic Panel [[id number]] Collected: 03/09/24 1002    Specimen: Blood Updated: 03/09/24 1150     Glucose 95 mg/dL      BUN 14 mg/dL      Creatinine 0.76  mg/dL      Sodium 140 mmol/L      Potassium 4.5 mmol/L      Chloride 102 mmol/L      CO2 24.9 mmol/L      Calcium 8.9 mg/dL      Total Protein 6.8 g/dL      Albumin 3.6 g/dL      ALT (SGPT) 11 U/L      AST (SGOT) 15 U/L      Alkaline Phosphatase 42 U/L      Total Bilirubin 0.3 mg/dL      Globulin 3.2 gm/dL      A/G Ratio 1.1 g/dL      BUN/Creatinine Ratio 18.4     Anion Gap 13.1 mmol/L      eGFR 76.4 mL/min/1.73     Narrative:      GFR Normal >60  Chronic Kidney Disease <60  Kidney Failure <15    The GFR formula is only valid for adults with stable renal function between ages 18 and 70.    CBC Auto Differential [[id number]]  (Abnormal) Collected: 03/09/24 1002    Specimen: Blood Updated: 03/09/24 1017     WBC 9.76 10*3/mm3      RBC 4.70 10*6/mm3      Hemoglobin 13.7 g/dL      Hematocrit 41.8 %      MCV 88.9 fL      MCH 29.1 pg      MCHC 32.8 g/dL      RDW 13.1 %      RDW-SD 42.5 fl      MPV 9.0 fL      Platelets 467 10*3/mm3      Neutrophil % 65.7 %      Lymphocyte % 25.9 %      Monocyte % 6.5 %      Eosinophil % 0.8 %      Basophil % 0.8 %      Immature Grans % 0.3 %      Neutrophils, Absolute 6.41 10*3/mm3      Lymphocytes, Absolute 2.53 10*3/mm3      Monocytes, Absolute 0.63 10*3/mm3      Eosinophils, Absolute 0.08 10*3/mm3      Basophils, Absolute 0.08 10*3/mm3      Immature Grans, Absolute 0.03 10*3/mm3      nRBC 0.0 /100 WBC     Urinalysis With Culture If Indicated - Urine, Clean Catch [[id number]]  (Abnormal) Collected: 03/09/24 1007    Specimen: Urine, Clean Catch Updated: 03/09/24 1019     Color, UA Yellow     Appearance, UA Cloudy     pH, UA 7.0     Specific Gravity, UA 1.016     Glucose, UA Negative     Ketones, UA Negative     Bilirubin, UA Negative     Blood, UA Negative     Protein, UA Trace     Leuk Esterase, UA Moderate (2+)     Nitrite, UA Positive     Urobilinogen, UA 0.2 E.U./dL    Narrative:      In absence of clinical symptoms, the presence of pyuria, bacteria, and/or nitrites on the urinalysis  result does not correlate with infection.    Urinalysis, Microscopic Only - Urine, Clean Catch [[id number]]  (Abnormal) Collected: 03/09/24 1007    Specimen: Urine, Clean Catch Updated: 03/09/24 1019     RBC, UA 0-2 /HPF      WBC, UA Too Numerous to Count /HPF      Bacteria, UA 4+ /HPF      Squamous Epithelial Cells, UA 0-2 /HPF      Hyaline Casts, UA 3-6 /LPF      Methodology Automated Microscopy    Urine Culture - Urine, Urine, Clean Catch [[id number]] Collected: 03/09/24 1007    Specimen: Urine, Clean Catch Updated: 03/09/24 1019             Imaging:    CT Head Without Contrast    Result Date: 3/9/2024  PROCEDURE: CT HEAD WO CONTRAST  COMPARISON:  [facility], CT, CT HEAD WO CONTRAST, 7/10/2023, 16:53. INDICATIONS: AMS/FALL  PROTOCOL:   Standard imaging protocol performed    RADIATION:   DLP: 996 mGy*cm   MA and/or KV was adjusted to minimize radiation dose.     TECHNIQUE: After obtaining the patient's consent, CT images were obtained without non-ionic intravenous contrast material.  FINDINGS:  No skull fracture.  No blood in the visualized paranasal sinuses/middle ear cavities.  Intracranially, no hemorrhage, edema, or mass effect.  No abnormal extra-axial fluid collection.  There is generalized atrophy.  There are old left basal ganglia lacunar and left frontal periventricular white matter infarcts.       No acute intracranial process     BRIDGET DUMAS MD       Electronically Signed and Approved By: BRIDGET DUMAS MD on 3/09/2024 at 10:48             XR Hips Bilateral With or Without Pelvis 2 View    Result Date: 3/9/2024  PROCEDURE: XR HIPS BILATERAL W OR WO PELVIS 2 VIEW  COMPARISON: [facility], CR, XR HIP W OR WO PELVIS 2-3 VIEW LEFT, 7/10/2023, 14:16.  INDICATIONS: fall/ ams  FINDINGS:  No fracture of the proximal right and left femur is demonstrated.  Both hip joints are intact.  There are nonacute fractures of the left superior and inferior pubic rami that were present on the  comparison exam.  No acute fracture of the pelvis is demonstrated         1. No acute fracture demonstrated  2. Old left pubic bone fractures      BRIDGET DUMAS MD       Electronically Signed and Approved By: BRIDGET DUMAS MD on 3/09/2024 at 10:23                Differential Diagnosis and Discussion:    Trauma:  Differential diagnosis considered but not limited to were subarachnoid hemorrhage, intracranial bleeding, pneumothorax, cardiac contusion, lung contusion, intra-abdominal bleeding, and compartment syndrome of any extremity or other significant traumatic pathology    All labs were reviewed and interpreted by me.  All X-rays impressions were independently interpreted by me.  CT scan radiology impression was interpreted by me.    MDM  Number of Diagnoses or Management Options  Fall, initial encounter  Urinary tract infection without hematuria, site unspecified  Diagnosis management comments: In summary this is a 88-year-old female who presents to the emergency department from local nursing home for evaluation after unwitnessed fall.  CT brain independently reviewed by me is unremarkable for acute pathology.  Pelvis x-ray and bilateral hips are unremarkable for acute traumatic injury.  CBC independently reviewed by me and shows no critical abnormalities.  CMP independently reviewed by me and shows no critical abnormalities.  Urinalysis positive for UTI for which patient was prescribed antibiotics.  She otherwise appears at baseline.  Very strict return to ER and follow-up instructions have been provided to the patient.                   Patient Care Considerations:    NARCOTICS: I considered prescribing opiate pain medication as an outpatient, however no narcotic requiring condition identified      Consultants/Shared Management Plan:    None    Social Determinants of Health:    Patient is a nursing home/assisted living resident and has reliable access to care.      Disposition and Care  Coordination:    Discharged: The patient is suitable and stable for discharge with no need for consideration of admission.    I have explained the patient´s condition, diagnoses and treatment plan based on the information available to me at this time. I have answered questions and addressed any concerns. The patient has a good  understanding of the patient´s diagnosis, condition, and treatment plan as can be expected at this point. The vital signs have been stable. The patient´s condition is stable and appropriate for discharge from the emergency department.      The patient will pursue further outpatient evaluation with the primary care physician or other designated or consulting physician as outlined in the discharge instructions. They are agreeable to this plan of care and follow-up instructions have been explained in detail. The patient has received these instructions in written format and has expressed an understanding of the discharge instructions. The patient is aware that any significant change in condition or worsening of symptoms should prompt an immediate return to this or the closest emergency department or call to 911.  I have explained discharge medications and the need for follow up with the patient/caretakers. This was also printed in the discharge instructions. Patient was discharged with the following medications and follow up:      Medication List        New Prescriptions      cefuroxime 500 MG tablet  Commonly known as: CEFTIN  Take 1 tablet by mouth 2 (Two) Times a Day for 5 days.               Where to Get Your Medications        You can get these medications from any pharmacy    Bring a paper prescription for each of these medications  cefuroxime 500 MG tablet      Joseph Hopkins MD  [address] [facility] [address] [phone]    In 1 week         Final diagnoses:   Fall, initial encounter   Urinary tract infection without hematuria, site unspecified        ED Disposition        ED Disposition   Discharge    Condition   Stable    Comment   Discharge instructions reported to Vanita at [facility].               This medical record created using voice recognition software.            Guilherme Lobato MD  03/09/24 7250

## 2024-03-11 LAB — BACTERIA SPEC AEROBE CULT: ABNORMAL

## 2024-07-12 ENCOUNTER — TRANSCRIBE ORDERS (OUTPATIENT)
Dept: ADMINISTRATIVE | Facility: HOSPITAL | Age: 89
End: 2024-07-12
Payer: MEDICARE

## 2024-07-12 DIAGNOSIS — R13.12 OROPHARYNGEAL DYSPHAGIA: Primary | ICD-10-CM

## 2024-08-07 ENCOUNTER — HOSPITAL ENCOUNTER (OUTPATIENT)
Dept: GENERAL RADIOLOGY | Facility: HOSPITAL | Age: 89
Discharge: HOME | End: 2024-08-07
Admitting: INTERNAL MEDICINE
Payer: MEDICARE

## 2024-08-07 DIAGNOSIS — R13.12 OROPHARYNGEAL DYSPHAGIA: ICD-10-CM

## 2024-08-07 PROCEDURE — A9270 NON-COVERED ITEM OR SERVICE: HCPCS | Performed by: INTERNAL MEDICINE

## 2024-08-07 PROCEDURE — 63710000001 BARIUM SULFATE 40 % RECONSTITUTED SUSPENSION: Performed by: INTERNAL MEDICINE

## 2024-08-07 PROCEDURE — 92611 MOTION FLUOROSCOPY/SWALLOW: CPT

## 2024-08-07 PROCEDURE — 74230 X-RAY XM SWLNG FUNCJ C+: CPT

## 2024-08-07 PROCEDURE — 63710000001 BARIUM SULFATE 40 % SUSPENSION: Performed by: INTERNAL MEDICINE

## 2024-08-07 PROCEDURE — 63710000001 BARIUM SULFATE 40 % PASTE: Performed by: INTERNAL MEDICINE

## 2024-08-07 RX ADMIN — BARIUM SULFATE 10 ML: 400 PASTE ORAL at 09:57

## 2024-08-07 RX ADMIN — BARIUM SULFATE 55 ML: 0.81 POWDER, FOR SUSPENSION ORAL at 09:57

## 2024-08-07 RX ADMIN — BARIUM SULFATE 50 ML: 400 SUSPENSION ORAL at 09:57

## 2024-08-07 NOTE — MBS/VFSS/FEES
Outpatient  - Speech Language Pathology   Swallow  Modified Barium Swallow   [place]     Patient Name: Geovanna Whitten  : 1935  MRN: [id number]  Today's Date: 2024               Admit Date: 2024    Visit Dx:     ICD-10-CM ICD-9-CM   1. Oropharyngeal dysphagia  R13.12 787.22     Patient Active Problem List   Diagnosis    Acquired spondylolisthesis    Arthritis    Awareness of heartbeats    Bladder problem    Corns and callosity    Degeneration of lumbar intervertebral disc    Disorder of thyroid    Esophageal reflux    Gout    Hearing decreased    Hyperlipemia    Hypertension    Migraines    Paroxysmal supraventricular tachycardia    Scoliosis    Thoracic or lumbosacral neuritis or radiculitis    Vertigo    Parkinson's disease    Pelvis fracture     Past Medical History:   Diagnosis Date    Acquired spondylolisthesis 2020    LUMBAR ARTHRITIS    Arm pain     Bladder disorder     Bladder problem     Carotid artery stenosis     Corn or callus     Degeneration of lumbar intervertebral disc 2020    Depression     Esophageal reflux     Essential hypertension     Gout     Hearing loss     High cholesterol     Hyperlipidemia     Hypertension     Hypothyroidism     Ingrown toenail     Joint pain     Leg pain     Leg swelling     Limb swelling     Lumbago 2020    Migraine     Muscle cramps     Numbness in feet     Parkinson's disease     Peripheral vascular disease     PSVT (paroxysmal supraventricular tachycardia)     Radiculopathy of lumbosacral region 2020    Scoliosis 2020    Screening mammogram, encounter for 2020    NORMAL    Thyroid disease     Thyroid disorder     Ulcerative colitis     Vertigo      Past Surgical History:   Procedure Laterality Date    BLADDER SURGERY      CATARACT EXTRACTION      COLONOSCOPY N/A 10/18/2016    Procedure: COLONOSCOPY TO CECUM WITH COLD BIOPSIES;  Surgeon: James Mckeon MD;  Location: [facility] ENDOSCOPY;  Service:     CYSTOCELE  REPAIR      EYE SURGERY      GLAUCOMA SURGERY      HYSTERECTOMY      RECTAL SURGERY       MODIFIED BARIUM SWALLOW STUDY: SPEECH PATHOLOGY REPORT    PERTINENT INFORMATION:  Patient is a 89year old female referred from nursing home to determine readiness for upgrading diet.    Patient was referred for an MBSS by Dr. Hopkins to rule out aspiration as well as to determine appropriate treatment plan for this patient.      PROCEDURE:    Patient was alert and cooperative.  The patient was viewed in lateral projection.  The following Ba consistencies were administered: Thin liquids, nectar thick liquids, purée consistencies and soft solids.  The following compensatory swallowing strategies were performed: Chin tuck      RESULTS:    1.  Oral stage is characterized by prolonged mastication with soft solids, reduced bolus preparation and diminished A-P propulsion of bolus throughout.  Pharyngeal phase is delayed with premature spillage into the vallecula prior to swallow with all trials.  Swallow completed with purée consistencies, no penetration or aspiration noted.  Some intermittent backflow from the esophagus however bolus clears.  Swallow completed with soft solids, no penetration or aspiration noted.  Swallow completed with nectar thick liquids with shallow transient laryngeal penetration x 1.  No aspiration noted with nectar thick consistencies.  Patient demonstrating silent aspiration with thin liquids, attempted bolus as modification and chin tuck however this did not appear to be effective in minimizing aspiration risk.      IMPRESSIONS:    Patient demonstrated moderate oropharyngeal dysphagia characterized by silent aspiration with thin liquids and 1 episode of transient penetration with nectar thick liquids.     FUNCTIONAL DEFICIT: Patient scored level 4 of 7 on Functional Communication Measures for swallowing indicating a 40-59% limitation in function for current status, goal status, and discharge  status.      RECOMMENDATIONS:   1.  Mechanical soft diet-nectar thick liquids  2.  90 degrees upright for all intake  3.  Double swallow  4.  Slow rate, small bites/drinks        YES: Patient/responsible party agrees with the plan of care and has been informed of all alternatives, risks and benefits.    Thank you for this referral.      EDUCATION  The patient has been educated in the following areas:   Dysphagia (Swallowing Impairment).          Gail Anderson, SLP  8/7/2024